# Patient Record
Sex: MALE | Race: WHITE | NOT HISPANIC OR LATINO | Employment: FULL TIME | URBAN - METROPOLITAN AREA
[De-identification: names, ages, dates, MRNs, and addresses within clinical notes are randomized per-mention and may not be internally consistent; named-entity substitution may affect disease eponyms.]

---

## 2017-07-19 ENCOUNTER — TRANSCRIBE ORDERS (OUTPATIENT)
Dept: ADMINISTRATIVE | Facility: HOSPITAL | Age: 37
End: 2017-07-19

## 2017-07-19 ENCOUNTER — APPOINTMENT (OUTPATIENT)
Dept: LAB | Facility: HOSPITAL | Age: 37
End: 2017-07-19
Payer: COMMERCIAL

## 2017-07-19 DIAGNOSIS — Z00.8 HEALTH EXAMINATION IN POPULATION SURVEY: Primary | ICD-10-CM

## 2017-07-19 DIAGNOSIS — Z00.8 HEALTH EXAMINATION IN POPULATION SURVEY: ICD-10-CM

## 2017-07-19 LAB
CHOLEST SERPL-MCNC: 212 MG/DL (ref 50–200)
EST. AVERAGE GLUCOSE BLD GHB EST-MCNC: 108 MG/DL
HBA1C MFR BLD: 5.4 % (ref 4.2–6.3)
HDLC SERPL-MCNC: 38 MG/DL (ref 40–60)
LDLC SERPL CALC-MCNC: 144 MG/DL (ref 0–100)
TRIGL SERPL-MCNC: 149 MG/DL

## 2017-07-19 PROCEDURE — 83036 HEMOGLOBIN GLYCOSYLATED A1C: CPT | Performed by: PREVENTIVE MEDICINE

## 2017-07-19 PROCEDURE — 36415 COLL VENOUS BLD VENIPUNCTURE: CPT

## 2017-07-19 PROCEDURE — 80061 LIPID PANEL: CPT

## 2018-06-29 ENCOUNTER — OFFICE VISIT (OUTPATIENT)
Dept: URGENT CARE | Facility: CLINIC | Age: 38
End: 2018-06-29
Payer: COMMERCIAL

## 2018-06-29 VITALS
HEIGHT: 73 IN | WEIGHT: 270.2 LBS | RESPIRATION RATE: 16 BRPM | SYSTOLIC BLOOD PRESSURE: 100 MMHG | OXYGEN SATURATION: 100 % | DIASTOLIC BLOOD PRESSURE: 70 MMHG | BODY MASS INDEX: 35.81 KG/M2 | TEMPERATURE: 96.5 F | HEART RATE: 65 BPM

## 2018-06-29 DIAGNOSIS — L25.5 CONTACT DERMATITIS DUE TO PLANT: Primary | ICD-10-CM

## 2018-06-29 PROCEDURE — 99203 OFFICE O/P NEW LOW 30 MIN: CPT | Performed by: FAMILY MEDICINE

## 2018-06-29 RX ORDER — PREDNISONE 10 MG/1
TABLET ORAL
Qty: 21 TABLET | Refills: 0 | Status: SHIPPED | OUTPATIENT
Start: 2018-06-29 | End: 2018-07-04

## 2018-06-29 RX ORDER — METHYLPREDNISOLONE SODIUM SUCCINATE 125 MG/2ML
125 INJECTION, POWDER, LYOPHILIZED, FOR SOLUTION INTRAMUSCULAR; INTRAVENOUS ONCE
Status: COMPLETED | OUTPATIENT
Start: 2018-06-29 | End: 2018-06-29

## 2018-06-29 RX ADMIN — METHYLPREDNISOLONE SODIUM SUCCINATE 125 MG: 125 INJECTION, POWDER, LYOPHILIZED, FOR SOLUTION INTRAMUSCULAR; INTRAVENOUS at 17:14

## 2018-06-29 NOTE — PATIENT INSTRUCTIONS
Contact Dermatitis due to plant   - Solumedrol- 125 mg IM x 1 dose administered in office today   - oral Prednisone taper prescribed, to be completed as directed  - take Benadryl-50 mg po at bedtime for itching   - apply calamine lotion to the rash  - follow up w/ pcp for re-check on Monday  - if symptoms are persisting despite treatment, worsening, or any news symptoms present, should be seen in the ER

## 2018-06-29 NOTE — PROGRESS NOTES
[email or web address] [facility]        NAME: Manoj Alston is a 44 y o  male  : 1980    MRN: [id number]  DATE: 2018  TIME: 7:38 PM    Assessment and Plan   Contact dermatitis due to plant [L25 5]  1  Contact dermatitis due to plant  methylPREDNISolone sodium succinate (Solu-MEDROL) injection 125 mg    predniSONE 10 mg tablet         Patient Instructions     Patient Instructions   Contact Dermatitis due to plant   - Solumedrol- 125 mg IM x 1 dose administered in office today   - oral Prednisone taper prescribed, to be completed as directed  - take Benadryl-50 mg po at bedtime for itching   - apply calamine lotion to the rash  - follow up w/ pcp for re-check on Monday  - if symptoms are persisting despite treatment, worsening, or any news symptoms present, should be seen in the ER     Chief Complaint     Chief Complaint   Patient presents with   [facility]         History of Present Illness       45 yo male presents for a rash which has been present for the past week  He states the day before the rash began he was outdoors pulling a tree out of the ground which had poison ivy growing on it  He was wearing a sleeveless t-shirt and states he came in direct contact w/ the poison ivy plants  He states the rash is red, bumpy, and very itchy  He has been applying rubbing alcohol to it and soaking in Epson salts but does not note any improvement  The rash is present on both arms, in the right axillary region, and the lower abdomen  Review of Systems   Review of Systems   Constitutional: Negative  HENT: Negative  Eyes: Negative  Respiratory: Negative  Cardiovascular: Negative  Gastrointestinal: Negative  Musculoskeletal: Negative  Skin:        As noted in HPI   Neurological: Negative            Current Medications       Current Outpatient Prescriptions:     predniSONE 10 mg tablet, 60 mg po today, 50 mg po tomorrow, 40 mg po the next day, 30 mg po the next day, 20 mg po the day after, and 10 mg po the last day, Disp: 21 tablet, Rfl: 0  No current facility-administered medications for this visit  Current Allergies     Allergies as of 06/29/2018    (No Known Allergies)            The following portions of the patient's history were reviewed and updated as appropriate: allergies, current medications, past family history, past medical history, past social history, past surgical history and problem list      History reviewed  No pertinent past medical history  History reviewed  No pertinent surgical history  Family History   Problem Relation Age of Onset    No Known Problems Mother     No Known Problems Father          Medications have been verified  Objective   /70   Pulse 65   Temp (!) 96 5 °F (35 8 °C)   Resp 16   Ht 6' 1" (1 854 m)   Wt 123 kg (270 lb 3 2 oz)   SpO2 100%   BMI 35 65 kg/m²        Physical Exam     Physical Exam   Constitutional: He is oriented to person, place, and time  Vital signs are normal  He appears well-developed and well-nourished  He is active and cooperative  Non-toxic appearance  He does not have a sickly appearance  He does not appear ill  No distress  Neurological: He is alert and oriented to person, place, and time  Skin: Skin is intact  Rash noted  He is not diaphoretic  Multiple linear areas and patches of erythematous vesicles noted BL upper extremities and lower abdomen  There is a large erythematous maculopapular rash in the right axillary region  No central clearing, does not appear to be a bull's eye rash  Psychiatric: He has a normal mood and affect  His behavior is normal  Judgment and thought content normal    Nursing note and vitals reviewed

## 2018-08-08 ENCOUNTER — TRANSCRIBE ORDERS (OUTPATIENT)
Dept: ADMINISTRATIVE | Facility: HOSPITAL | Age: 38
End: 2018-08-08

## 2018-08-08 ENCOUNTER — APPOINTMENT (OUTPATIENT)
Dept: LAB | Facility: HOSPITAL | Age: 38
End: 2018-08-08

## 2018-08-08 DIAGNOSIS — Z00.8 HEALTH EXAMINATION IN POPULATION SURVEY: Primary | ICD-10-CM

## 2018-08-08 DIAGNOSIS — Z00.8 HEALTH EXAMINATION IN POPULATION SURVEY: ICD-10-CM

## 2018-08-08 LAB
CHOLEST SERPL-MCNC: 228 MG/DL (ref 50–200)
EST. AVERAGE GLUCOSE BLD GHB EST-MCNC: 108 MG/DL
HBA1C MFR BLD: 5.4 % (ref 4.2–6.3)
HDLC SERPL-MCNC: 37 MG/DL (ref 40–60)
LDLC SERPL CALC-MCNC: 153 MG/DL (ref 0–100)
NONHDLC SERPL-MCNC: 191 MG/DL
TRIGL SERPL-MCNC: 189 MG/DL

## 2018-08-08 PROCEDURE — 80061 LIPID PANEL: CPT

## 2018-08-08 PROCEDURE — 83036 HEMOGLOBIN GLYCOSYLATED A1C: CPT

## 2018-08-08 PROCEDURE — 36415 COLL VENOUS BLD VENIPUNCTURE: CPT

## 2019-02-06 ENCOUNTER — OFFICE VISIT (OUTPATIENT)
Dept: BARIATRICS | Facility: CLINIC | Age: 39
End: 2019-02-06
Payer: COMMERCIAL

## 2019-02-06 VITALS
DIASTOLIC BLOOD PRESSURE: 72 MMHG | WEIGHT: 266.13 LBS | BODY MASS INDEX: 36.04 KG/M2 | HEART RATE: 63 BPM | HEIGHT: 72 IN | TEMPERATURE: 97.4 F | RESPIRATION RATE: 16 BRPM | SYSTOLIC BLOOD PRESSURE: 108 MMHG

## 2019-02-06 DIAGNOSIS — E66.01 SEVERE OBESITY (HCC): Primary | ICD-10-CM

## 2019-02-06 DIAGNOSIS — E78.2 MIXED HYPERLIPIDEMIA: ICD-10-CM

## 2019-02-06 PROBLEM — E78.5 HYPERLIPIDEMIA: Status: ACTIVE | Noted: 2019-02-06

## 2019-02-06 PROCEDURE — 99204 OFFICE O/P NEW MOD 45 MIN: CPT | Performed by: INTERNAL MEDICINE

## 2019-02-06 NOTE — PROGRESS NOTES
Assessment/Plan:    Severe obesity ([facility] )  -Discussed options of HealthyCORE-Intensive Lifestyle Intervention Program, Very Low Calorie Diet-VLCD and Conservative Program and the role of weight loss medications   -Initial weight loss goal of 5-10% weight loss for improved health  -Screening labs: reviewed HgbA1c and Lipid panel, recommend CMP, TSH  -Patient is interested in pursuing VLCD with transition to conservative program    +STOP BANG (4/8):  -reviewed risks of undiagnosed/untreated sleep apnea  -Recommended referral to sleep medicine provider for further evaluation   -Patient declined and would like to see if risks improve with weight loss  Repeat STOP BANG 3-4 months    Labs ordered: yes  HTN meds addressed: n/a  DM2 meds addressed: n/a  VLCD time restriction based on BMI: n/a    Hyperlipidemia  -Triglycerides/LDL elevated, HDL low  -recommend dietary/lifestyle changes    Goals:    Food log (ie ) www myfitnesspal com,sparkpeople  com,loseit com,calorieking  com,etc    No sugary beverages  At least 80oz of water daily  Follow up in approximately 1 week with Non-Surgical Dietician and 6 weeks w/ PA-C    Diagnoses and all orders for this visit:    Severe obesity ([facility] )  -     TSH, 3rd generation with Free T4 reflex; Future  -     Comprehensive metabolic panel; Future    Mixed hyperlipidemia          Subjective:   Chief Complaint   Patient presents with    Consult     Pt is here for MWM consult  Pt reports he is not taking medications  Patient ID: Raul George  is a 43 y o  male with excess weight/obesity here to pursue weight managment      Past Medical History:   Diagnosis Date    Hypercholesterolemia          HPI:  Obesity/Excess Weight:  Severity: Severe  Onset:  Past 8 years - yo yo with weight , usually loses weight in the summer months and then regains it in the winter  Modifiers: Diet and Exercise  Contributing factors: poor food choices, eating on the go, stays up late and eats/snacks in evening, no regular exercise  Associated symptoms: increased joint pain and clothes do not fit    Goals: 200-210 lbs    B: 1-2 bagels with butter + coffee + cream  S: mixed nuts  L: skips  S: mixed nuts  D: protein + meat + starch  S: pizza or anything In the house    Hydration: 64 oz water, 6 cups of coffee + cream/sguar, occasional juice/soda    The following portions of the patient's history were reviewed and updated as appropriate: allergies, current medications, past family history, past medical history, past social history, past surgical history and problem list     Review of Systems   Constitutional: Negative for chills and fever  HENT: Negative for sore throat  Respiratory: Negative for cough and shortness of breath  Cardiovascular: Negative for chest pain and palpitations  Gastrointestinal: Negative for abdominal pain, constipation, diarrhea, nausea and vomiting  Genitourinary: Negative for dysuria  Musculoskeletal: Positive for arthralgias  Skin: Negative for rash  Neurological: Negative for dizziness and headaches  Psychiatric/Behavioral:        Denies depression       Objective:    /72 (BP Location: Left arm, Patient Position: Sitting, Cuff Size: Large)   Pulse 63   Temp (!) 97 4 °F (36 3 °C) (Tympanic)   Resp 16   Ht 6' (1 829 m)   Wt 121 kg (266 lb 2 oz)   BMI 36 09 kg/m²     Physical Exam   Nursing note and vitals reviewed  Constitutional   General appearance: Abnormal   well developed and obese  Eyes No conjunctival pallor  Ears, Nose, Mouth, and Throat Oral mucosa moist    Pulmonary   Respiratory effort: No increased work of breathing or signs of respiratory distress  Auscultation of lungs: Clear to auscultation, equal breath sounds bilaterally, no wheezes, no rales, no rhonci  Cardiovascular   Auscultation of heart: Normal rate and rhythm, normal S1 and S2, without murmurs  Examination of extremities for edema and/or varicosities: Normal   no edema  Abdomen   Abdomen: Abnormal   The abdomen was obese  Bowel sounds were normal  The abdomen was soft and nontender     Musculoskeletal   Gait and station: Normal     Psychiatric   Orientation to person, place and time: Normal     Affect: appropriate

## 2019-02-06 NOTE — PATIENT INSTRUCTIONS
Goals: Food log (ie ) www myfitnesspal com,sparkpeople  com,loseit com,calorieking  com,etc    No sugary beverages  At least 80oz of water daily

## 2019-02-06 NOTE — ASSESSMENT & PLAN NOTE
-Discussed options of HealthyCORE-Intensive Lifestyle Intervention Program, Very Low Calorie Diet-VLCD and Conservative Program and the role of weight loss medications   -Initial weight loss goal of 5-10% weight loss for improved health  -Screening labs: reviewed HgbA1c and Lipid panel, recommend CMP, TSH  -Patient is interested in pursuing VLCD with transition to conservative program    +STOP BANG (4/8):  -reviewed risks of undiagnosed/untreated sleep apnea  -Recommended referral to sleep medicine provider for further evaluation   -Patient declined and would like to see if risks improve with weight loss   Repeat STOP BANG 3-4 months    Labs ordered: yes  HTN meds addressed: n/a  DM2 meds addressed: n/a  VLCD time restriction based on BMI: n/a

## 2019-02-07 ENCOUNTER — TELEPHONE (OUTPATIENT)
Dept: BARIATRICS | Facility: CLINIC | Age: 39
End: 2019-02-07

## 2019-02-07 ENCOUNTER — APPOINTMENT (OUTPATIENT)
Dept: LAB | Facility: HOSPITAL | Age: 39
End: 2019-02-07
Payer: COMMERCIAL

## 2019-02-07 ENCOUNTER — TRANSCRIBE ORDERS (OUTPATIENT)
Dept: ADMINISTRATIVE | Facility: HOSPITAL | Age: 39
End: 2019-02-07

## 2019-02-07 DIAGNOSIS — E66.01 SEVERE OBESITY (HCC): ICD-10-CM

## 2019-02-07 LAB
ALBUMIN SERPL BCP-MCNC: 3.9 G/DL (ref 3.5–5)
ALP SERPL-CCNC: 48 U/L (ref 46–116)
ALT SERPL W P-5'-P-CCNC: 40 U/L (ref 12–78)
ANION GAP SERPL CALCULATED.3IONS-SCNC: 8 MMOL/L (ref 4–13)
AST SERPL W P-5'-P-CCNC: 19 U/L (ref 5–45)
BILIRUB SERPL-MCNC: 0.3 MG/DL (ref 0.2–1)
BUN SERPL-MCNC: 20 MG/DL (ref 5–25)
CALCIUM SERPL-MCNC: 9 MG/DL (ref 8.3–10.1)
CHLORIDE SERPL-SCNC: 104 MMOL/L (ref 100–108)
CO2 SERPL-SCNC: 30 MMOL/L (ref 21–32)
CREAT SERPL-MCNC: 1.26 MG/DL (ref 0.6–1.3)
GFR SERPL CREATININE-BSD FRML MDRD: 71 ML/MIN/1.73SQ M
GLUCOSE P FAST SERPL-MCNC: 99 MG/DL (ref 65–99)
POTASSIUM SERPL-SCNC: 4.7 MMOL/L (ref 3.5–5.3)
PROT SERPL-MCNC: 7.3 G/DL (ref 6.4–8.2)
SODIUM SERPL-SCNC: 142 MMOL/L (ref 136–145)
TSH SERPL DL<=0.05 MIU/L-ACNC: 2.23 UIU/ML (ref 0.36–3.74)

## 2019-02-07 PROCEDURE — 80053 COMPREHEN METABOLIC PANEL: CPT

## 2019-02-07 PROCEDURE — 36415 COLL VENOUS BLD VENIPUNCTURE: CPT

## 2019-02-07 PROCEDURE — 84443 ASSAY THYROID STIM HORMONE: CPT

## 2019-02-07 NOTE — TELEPHONE ENCOUNTER
----- Message from Greta Funk PA-C sent at 2/7/2019  9:14 AM EST -----  Staff message: please call patient and let him know that thyroid function, kidney function, liver function and fasting blood sugar is normal

## 2019-02-14 ENCOUNTER — OFFICE VISIT (OUTPATIENT)
Dept: BARIATRICS | Facility: CLINIC | Age: 39
End: 2019-02-14

## 2019-02-14 VITALS — HEIGHT: 72 IN | WEIGHT: 269.8 LBS | BODY MASS INDEX: 36.54 KG/M2

## 2019-02-14 DIAGNOSIS — R63.5 ABNORMAL WEIGHT GAIN: ICD-10-CM

## 2019-02-14 PROCEDURE — VLCD

## 2019-02-14 PROCEDURE — 99999 PR OFFICE/OUTPT VISIT,PROCEDURE ONLY: CPT

## 2019-02-14 NOTE — PROGRESS NOTES
Initial RD session for VLCD completed  Components of diet discussed including ketosis, hydration, possible side effects  2 weeks of product ordered and received  Will f/u via e-mail on 2/15

## 2019-02-15 ENCOUNTER — PATIENT OUTREACH (OUTPATIENT)
Dept: BARIATRICS | Facility: CLINIC | Age: 39
End: 2019-02-15

## 2019-02-28 ENCOUNTER — OFFICE VISIT (OUTPATIENT)
Dept: BARIATRICS | Facility: CLINIC | Age: 39
End: 2019-02-28

## 2019-02-28 VITALS
WEIGHT: 253.3 LBS | SYSTOLIC BLOOD PRESSURE: 132 MMHG | DIASTOLIC BLOOD PRESSURE: 88 MMHG | HEIGHT: 72 IN | BODY MASS INDEX: 34.31 KG/M2

## 2019-02-28 DIAGNOSIS — R63.5 ABNORMAL WEIGHT GAIN: Primary | ICD-10-CM

## 2019-02-28 PROCEDURE — 99999 PR OFFICE/OUTPT VISIT,PROCEDURE ONLY: CPT

## 2019-02-28 PROCEDURE — VLCD

## 2019-03-13 ENCOUNTER — APPOINTMENT (OUTPATIENT)
Dept: LAB | Facility: HOSPITAL | Age: 39
End: 2019-03-13
Payer: COMMERCIAL

## 2019-03-13 ENCOUNTER — TRANSCRIBE ORDERS (OUTPATIENT)
Dept: ADMINISTRATIVE | Facility: HOSPITAL | Age: 39
End: 2019-03-13

## 2019-03-13 DIAGNOSIS — R63.5 ABNORMAL WEIGHT GAIN: ICD-10-CM

## 2019-03-13 LAB
ANION GAP SERPL CALCULATED.3IONS-SCNC: 9 MMOL/L (ref 4–13)
BUN SERPL-MCNC: 17 MG/DL (ref 5–25)
CALCIUM SERPL-MCNC: 9.6 MG/DL (ref 8.3–10.1)
CHLORIDE SERPL-SCNC: 102 MMOL/L (ref 100–108)
CO2 SERPL-SCNC: 28 MMOL/L (ref 21–32)
CREAT SERPL-MCNC: 1.12 MG/DL (ref 0.6–1.3)
GFR SERPL CREATININE-BSD FRML MDRD: 82 ML/MIN/1.73SQ M
GLUCOSE P FAST SERPL-MCNC: 85 MG/DL (ref 65–99)
MAGNESIUM SERPL-MCNC: 2.1 MG/DL (ref 1.6–2.6)
POTASSIUM SERPL-SCNC: 3.7 MMOL/L (ref 3.5–5.3)
SODIUM SERPL-SCNC: 139 MMOL/L (ref 136–145)

## 2019-03-13 PROCEDURE — 36415 COLL VENOUS BLD VENIPUNCTURE: CPT

## 2019-03-13 PROCEDURE — 83735 ASSAY OF MAGNESIUM: CPT

## 2019-03-13 PROCEDURE — 80048 BASIC METABOLIC PNL TOTAL CA: CPT

## 2019-03-14 ENCOUNTER — OFFICE VISIT (OUTPATIENT)
Dept: BARIATRICS | Facility: CLINIC | Age: 39
End: 2019-03-14

## 2019-03-14 VITALS
SYSTOLIC BLOOD PRESSURE: 116 MMHG | BODY MASS INDEX: 33.1 KG/M2 | WEIGHT: 244.4 LBS | DIASTOLIC BLOOD PRESSURE: 81 MMHG | HEIGHT: 72 IN

## 2019-03-14 DIAGNOSIS — R63.5 ABNORMAL WEIGHT GAIN: ICD-10-CM

## 2019-03-14 PROCEDURE — RECHECK

## 2019-03-14 PROCEDURE — VLCD

## 2019-03-14 NOTE — PROGRESS NOTES
Weight Management Medical Nutrition Assessment  Fermin Adams is here for his 4 week VLCD f/u  Current wt: 244 4 lbs  Loss of lbs 8 9 lbs in 2 weeks, and 25 4 lbs overall (average loss of 6 3 lbs/week)  No adverse effects noted  Hydration adequate  Pt is having olives on days that he is more physically active and plays basketball with his kids  Pt currently has a cold  Advised pt to drink extra water, utilize sugar-free cough drops, and to let his PCP know he is following the VLCD if he goes to his doctor  Labs were reviewed (WNL), blood pressure was taken, and directions for ordering product online were given  Will continue VLCD at this time  Anthropometric Measurements  Start Weight (lbs): 269 8 lbs  Current Weight (lbs): 244 4 lbs  TBW % Change from start weight: 8%  Ideal Body Weight (lbs): 178 lbs    Weight Loss History  Previous weight loss attempts: Self Created Diets (Portion Control, Healthy Food Choices, etc )    Food and Nutrition Related History    Meal Plan  Breakfast: meal replacement upon waking @ 7:30 am  Snack: noone   Lunch: meal replacement 11:30 am - 12 pm  Snack: protein bar @ 4 pm  Dinner: meal replacement @ 7 pm - 8 pm  Snack: none    Olives as a snack     Beverages: 80 oz water & coffee w/1 tbsp light cream    Food restrictions: <50 g CHO  Cooking: self   Food Shopping: self    Physical Activity Intake  Activity: plays basketball with his kids  Physical limitations/barriers to exercise: no intense exercise on VLCD    Estimated Needs  Energy  Bear Hungry Horse Energy Needs: BMR : 2062   1-2# loss sedentary: 5700-1403                Protein:  g (1 2-1 5g/kg IBW)  Fluid: 93 oz (35mL/kg IBW)    Nutrition Diagnosis  Yes; Overweight/obesity  related to Excess energy intake as evidenced by  BMI more than normative standard for age and sex (obesity-grade I 26-30  9)       Nutrition Intervention  Meal Plan  3 meal replacements & 1 protein bar  80+ oz water  1-2 approved snacks on more physical days  Emergency meal as needed/appropriate     Nutrition Education:   Calorie controlled menu  Lean protein food choices  Healthy snack options  Food journaling tips    Nutrition Counseling:  Strategies: meal planning, portion sizes, healthy snack choices, hydration, fiber intake, protein intake, exercise, food journal    Monitoring and Evaluation:  Evaluation criteria:  Energy Intake: ~800 kcal  Meet protein needs  Maintain adequate hydration  Monitor weekly weight  Meal planning/preparation  Food journal   Portions at mealtimes and snacks  Physical activity     Barriers to learning:none  Readiness to change: Action  Comprehension: excellent  Expected Compliance: excellent

## 2019-03-28 ENCOUNTER — OFFICE VISIT (OUTPATIENT)
Dept: BARIATRICS | Facility: CLINIC | Age: 39
End: 2019-03-28
Payer: COMMERCIAL

## 2019-03-28 VITALS
SYSTOLIC BLOOD PRESSURE: 118 MMHG | RESPIRATION RATE: 16 BRPM | HEART RATE: 61 BPM | WEIGHT: 239.38 LBS | DIASTOLIC BLOOD PRESSURE: 70 MMHG | BODY MASS INDEX: 32.42 KG/M2 | HEIGHT: 72 IN | TEMPERATURE: 97.4 F

## 2019-03-28 DIAGNOSIS — R63.5 ABNORMAL WEIGHT GAIN: Primary | ICD-10-CM

## 2019-03-28 PROBLEM — E66.811 CLASS 1 OBESITY: Status: ACTIVE | Noted: 2019-02-06

## 2019-03-28 PROBLEM — E66.9 CLASS 1 OBESITY: Status: ACTIVE | Noted: 2019-02-06

## 2019-03-28 PROCEDURE — 99214 OFFICE O/P EST MOD 30 MIN: CPT | Performed by: PHYSICIAN ASSISTANT

## 2019-03-28 NOTE — PATIENT INSTRUCTIONS
Goals: Food log (ie ) www myfitnesspal com,sparkpeople  com,loseit com,calorieking  com,etc    No sugary beverages  At least 80oz of water daily      Please get blood work 2-3 days prior to next appointment

## 2019-03-28 NOTE — PROGRESS NOTES
Assessment/Plan:    Class 1 obesity  -Patient is pursuing Very Low Calorie Diet-VLCD  -Initial weight loss goal of 5-10% weight loss for improved health  -Class 2 --> Class 1 obesity    Initial: 269 8  lbs  Current: 239 6 lbs  Change: -30 2 lbs  Goal: 200-210 lbs    Labs ordered: yes  HTN meds addressed: n/a  DM2 meds addressed: n/a  VLCD time restriction based on BMI: n/a    Goals:    Food log (ie ) www myfitnesspal com,sparkpeople  com,loseit com,calorieking  com,etc    No sugary beverages  At least 80oz of water daily  Please get blood work 2-3 days prior to next appointment    Follow up in approximately 2 weeks with Non-Surgical Physician/Advanced Practitioner  25 minute visit, >50% face-to-face time spent counseling patient on diet behavior and exercise modification for weight loss  Diagnoses and all orders for this visit:    Abnormal weight gain  -     Basic metabolic panel; Future  -     Magnesium; Future          Subjective:   Chief Complaint   Patient presents with    Follow-up     Pt is here for MWM follow up  Patient ID: Rob Galvez  is a 43 y o  male with excess weight/obesity here to pursue weight managment  Patient is pursuing Very Low Calorie Diet-VLCD  HPI Patient presents for 6 week VLCD follow up  Doing well tolerated meal replacements  Reports he is moving his bowels every 3 days  Reports he did a 10k walk this past weekend with his kids and felt horrible - reports he had an emergency meal that evening  Strongly advised against this degree of physical activity as it will slow his weight loss   Pitches for 2 hours M-F for his kids baseball practice - advised he have an approved snack such as hardboiled egg or cheese stick     The following portions of the patient's history were reviewed and updated as appropriate: allergies, current medications, past family history, past medical history, past social history, past surgical history and problem list     Review of Systems Respiratory: Negative for cough and shortness of breath  Cardiovascular: Negative for chest pain and palpitations  Gastrointestinal: Negative for abdominal pain, nausea and vomiting  Psychiatric/Behavioral:        Reports mood stable       Objective:    /70 (BP Location: Left arm, Patient Position: Sitting, Cuff Size: Standard)   Pulse 61   Temp (!) 97 4 °F (36 3 °C) (Tympanic)   Resp 16   Ht 6' (1 829 m)   Wt 109 kg (239 lb 6 oz)   BMI 32 47 kg/m²      Physical Exam   Nursing note and vitals reviewed  Constitutional   General appearance: Abnormal   well developed and obese  Eyes No conjunctival pallor  Ears, Nose, Mouth, and Throat Oral mucosa moist    Pulmonary   Respiratory effort: No increased work of breathing or signs of respiratory distress  Auscultation of lungs: Clear to auscultation, equal breath sounds bilaterally, no wheezes, no rales, no rhonci  Cardiovascular   Auscultation of heart: Normal rate and rhythm, normal S1 and S2, without murmurs  Examination of extremities for edema and/or varicosities: Normal   no edema  Abdomen   Abdomen: Abnormal   The abdomen was obese  Bowel sounds were normal  The abdomen was soft and nontender     Musculoskeletal   Gait and station: Normal     Psychiatric   Orientation to person, place and time: Normal     Affect: appropriate

## 2019-04-09 ENCOUNTER — APPOINTMENT (OUTPATIENT)
Dept: LAB | Facility: HOSPITAL | Age: 39
End: 2019-04-09
Payer: COMMERCIAL

## 2019-04-09 DIAGNOSIS — R63.5 ABNORMAL WEIGHT GAIN: ICD-10-CM

## 2019-04-09 LAB
ANION GAP SERPL CALCULATED.3IONS-SCNC: 7 MMOL/L (ref 4–13)
BUN SERPL-MCNC: 16 MG/DL (ref 5–25)
CALCIUM SERPL-MCNC: 9.4 MG/DL (ref 8.3–10.1)
CHLORIDE SERPL-SCNC: 107 MMOL/L (ref 100–108)
CO2 SERPL-SCNC: 30 MMOL/L (ref 21–32)
CREAT SERPL-MCNC: 1 MG/DL (ref 0.6–1.3)
GFR SERPL CREATININE-BSD FRML MDRD: 94 ML/MIN/1.73SQ M
GLUCOSE P FAST SERPL-MCNC: 84 MG/DL (ref 65–99)
MAGNESIUM SERPL-MCNC: 2.1 MG/DL (ref 1.6–2.6)
POTASSIUM SERPL-SCNC: 4.2 MMOL/L (ref 3.5–5.3)
SODIUM SERPL-SCNC: 144 MMOL/L (ref 136–145)

## 2019-04-09 PROCEDURE — 83735 ASSAY OF MAGNESIUM: CPT

## 2019-04-09 PROCEDURE — 80048 BASIC METABOLIC PNL TOTAL CA: CPT

## 2019-04-09 PROCEDURE — 36415 COLL VENOUS BLD VENIPUNCTURE: CPT

## 2019-04-11 ENCOUNTER — OFFICE VISIT (OUTPATIENT)
Dept: BARIATRICS | Facility: CLINIC | Age: 39
End: 2019-04-11

## 2019-04-11 VITALS
DIASTOLIC BLOOD PRESSURE: 75 MMHG | WEIGHT: 230.6 LBS | BODY MASS INDEX: 31.23 KG/M2 | HEIGHT: 72 IN | SYSTOLIC BLOOD PRESSURE: 129 MMHG

## 2019-04-11 DIAGNOSIS — R63.5 ABNORMAL WEIGHT GAIN: ICD-10-CM

## 2019-04-11 PROCEDURE — VLCD

## 2019-04-11 PROCEDURE — RECHECK

## 2019-04-25 ENCOUNTER — TELEPHONE (OUTPATIENT)
Dept: FAMILY MEDICINE CLINIC | Facility: CLINIC | Age: 39
End: 2019-04-25

## 2019-04-25 ENCOUNTER — OFFICE VISIT (OUTPATIENT)
Dept: BARIATRICS | Facility: CLINIC | Age: 39
End: 2019-04-25

## 2019-04-25 VITALS
DIASTOLIC BLOOD PRESSURE: 62 MMHG | SYSTOLIC BLOOD PRESSURE: 111 MMHG | WEIGHT: 224 LBS | HEIGHT: 72 IN | BODY MASS INDEX: 30.34 KG/M2

## 2019-04-25 DIAGNOSIS — R63.5 ABNORMAL WEIGHT GAIN: Primary | ICD-10-CM

## 2019-04-25 PROCEDURE — RECHECK

## 2019-04-25 PROCEDURE — VLCD

## 2019-05-06 ENCOUNTER — APPOINTMENT (OUTPATIENT)
Dept: LAB | Facility: HOSPITAL | Age: 39
End: 2019-05-06
Payer: COMMERCIAL

## 2019-05-06 ENCOUNTER — TRANSCRIBE ORDERS (OUTPATIENT)
Dept: ADMINISTRATIVE | Facility: HOSPITAL | Age: 39
End: 2019-05-06

## 2019-05-06 DIAGNOSIS — R63.5 ABNORMAL WEIGHT GAIN: ICD-10-CM

## 2019-05-06 LAB
ANION GAP SERPL CALCULATED.3IONS-SCNC: 9 MMOL/L (ref 4–13)
BUN SERPL-MCNC: 15 MG/DL (ref 5–25)
CALCIUM SERPL-MCNC: 9.1 MG/DL (ref 8.3–10.1)
CHLORIDE SERPL-SCNC: 106 MMOL/L (ref 100–108)
CO2 SERPL-SCNC: 28 MMOL/L (ref 21–32)
CREAT SERPL-MCNC: 1.05 MG/DL (ref 0.6–1.3)
GFR SERPL CREATININE-BSD FRML MDRD: 89 ML/MIN/1.73SQ M
GLUCOSE P FAST SERPL-MCNC: 92 MG/DL (ref 65–99)
MAGNESIUM SERPL-MCNC: 2 MG/DL (ref 1.6–2.6)
POTASSIUM SERPL-SCNC: 3.9 MMOL/L (ref 3.5–5.3)
SODIUM SERPL-SCNC: 143 MMOL/L (ref 136–145)

## 2019-05-06 PROCEDURE — 83735 ASSAY OF MAGNESIUM: CPT

## 2019-05-06 PROCEDURE — 80048 BASIC METABOLIC PNL TOTAL CA: CPT

## 2019-05-06 PROCEDURE — 36415 COLL VENOUS BLD VENIPUNCTURE: CPT

## 2019-05-09 ENCOUNTER — OFFICE VISIT (OUTPATIENT)
Dept: BARIATRICS | Facility: CLINIC | Age: 39
End: 2019-05-09

## 2019-05-09 VITALS
DIASTOLIC BLOOD PRESSURE: 86 MMHG | SYSTOLIC BLOOD PRESSURE: 122 MMHG | WEIGHT: 215.2 LBS | BODY MASS INDEX: 29.15 KG/M2 | HEIGHT: 72 IN

## 2019-05-09 DIAGNOSIS — R63.5 ABNORMAL WEIGHT GAIN: ICD-10-CM

## 2019-05-09 PROCEDURE — RECHECK

## 2019-05-09 PROCEDURE — VLCD

## 2019-05-23 ENCOUNTER — OFFICE VISIT (OUTPATIENT)
Dept: BARIATRICS | Facility: CLINIC | Age: 39
End: 2019-05-23

## 2019-05-23 VITALS — WEIGHT: 213.1 LBS | HEIGHT: 72 IN | BODY MASS INDEX: 28.86 KG/M2

## 2019-05-23 DIAGNOSIS — R63.5 ABNORMAL WEIGHT GAIN: ICD-10-CM

## 2019-05-23 PROCEDURE — DB3PK

## 2019-05-23 PROCEDURE — RECHECK

## 2019-05-31 ENCOUNTER — OFFICE VISIT (OUTPATIENT)
Dept: BARIATRICS | Facility: CLINIC | Age: 39
End: 2019-05-31
Payer: COMMERCIAL

## 2019-05-31 VITALS
HEART RATE: 53 BPM | WEIGHT: 210.6 LBS | HEIGHT: 72 IN | DIASTOLIC BLOOD PRESSURE: 64 MMHG | BODY MASS INDEX: 28.53 KG/M2 | SYSTOLIC BLOOD PRESSURE: 110 MMHG | TEMPERATURE: 96.1 F | RESPIRATION RATE: 14 BRPM

## 2019-05-31 DIAGNOSIS — R63.5 ABNORMAL WEIGHT GAIN: Primary | ICD-10-CM

## 2019-05-31 DIAGNOSIS — E66.3 OVERWEIGHT: ICD-10-CM

## 2019-05-31 DIAGNOSIS — E78.2 MIXED HYPERLIPIDEMIA: ICD-10-CM

## 2019-05-31 PROCEDURE — 99214 OFFICE O/P EST MOD 30 MIN: CPT | Performed by: PHYSICIAN ASSISTANT

## 2019-06-13 ENCOUNTER — OFFICE VISIT (OUTPATIENT)
Dept: BARIATRICS | Facility: CLINIC | Age: 39
End: 2019-06-13

## 2019-06-13 VITALS — WEIGHT: 211.4 LBS | BODY MASS INDEX: 28.67 KG/M2

## 2019-06-13 DIAGNOSIS — R63.5 ABNORMAL WEIGHT GAIN: Primary | ICD-10-CM

## 2019-06-13 PROCEDURE — RECHECK

## 2019-08-16 ENCOUNTER — OFFICE VISIT (OUTPATIENT)
Dept: BARIATRICS | Facility: CLINIC | Age: 39
End: 2019-08-16
Payer: COMMERCIAL

## 2019-08-16 VITALS
TEMPERATURE: 97 F | HEIGHT: 72 IN | SYSTOLIC BLOOD PRESSURE: 102 MMHG | WEIGHT: 214.2 LBS | DIASTOLIC BLOOD PRESSURE: 70 MMHG | BODY MASS INDEX: 29.01 KG/M2 | RESPIRATION RATE: 14 BRPM | HEART RATE: 59 BPM

## 2019-08-16 DIAGNOSIS — R63.5 ABNORMAL WEIGHT GAIN: Primary | ICD-10-CM

## 2019-08-16 DIAGNOSIS — E78.2 MIXED HYPERLIPIDEMIA: ICD-10-CM

## 2019-08-16 DIAGNOSIS — E66.3 OVERWEIGHT: ICD-10-CM

## 2019-08-16 PROCEDURE — 99214 OFFICE O/P EST MOD 30 MIN: CPT | Performed by: PHYSICIAN ASSISTANT

## 2019-08-16 NOTE — PATIENT INSTRUCTIONS
Goals: Food log (ie ) www myfitnesspal com,sparkpeople  com,loseit com,calorieking  com,etc    No sugary beverages  At least 64oz of water daily  Increase physical activity by 10 minutes daily   Gradually increase physical activity to a goal of 5 days per week for 30 minutes of MODERATE intensity PLUS 2 days per week of FULL BODY resistance training  1500 calories per day 9227-9452 calories on exercise days  5-10 servings of fruits and vegetables per day  Incorporate protein shake for breakfast   Try logging 1 day on the weekend

## 2019-08-16 NOTE — PROGRESS NOTES
Assessment/Plan:    Overweight  -Patient is pursuing Conservative Program following 12 weeks of VLCD  -Initial weight loss goal of 5-10% weight loss for improved health - met  -Food logging has been more sporadic  Would like to continue to lose at this time  -Ran out of meal replacement products and reports breakfast meal has not been structured - often chooses convenient food options at work  Would like to start using meal replacement products again for breakfast meal  -has incorporated a running routine  -reviewed appropriate calorie ranges for continued weight loss    Initial: 269 8 lbs  Current: 214 2 lbs  Change: -55 6 lbs  (21% TBW)  Goal: 200 lbs or slightly under      Hyperlipidemia  -dietary/lifestyle changes  -will repeat Lipid panel at this time, has lab slip    Goals:    Food log (ie ) www myfitnesspal com,sparkpeople  com,loseit com,calorieking  com,etc    No sugary beverages  At least 64oz of water daily  Increase physical activity by 10 minutes daily  Gradually increase physical activity to a goal of 5 days per week for 30 minutes of MODERATE intensity PLUS 2 days per week of FULL BODY resistance training  6330-4281 calories per day 8467-2045 calories on exercise days  5-10 servings of fruits and vegetables per day  Incorporate protein shake for breakfast   Try logging 1 day on the weekend    Follow up in approximately 2 months with Non-Surgical Dietician and 4 months w/ PA-C  25 minute visit, >50% face-to-face time spent counseling patient on diet behavior and exercise modification for weight loss  Diagnoses and all orders for this visit:    Abnormal weight gain  Comments:  see plan under overweight    Overweight    Mixed hyperlipidemia          Subjective:   Chief Complaint   Patient presents with    Follow-up     pt is here for mwm follow up  Patient ID: Candido James  is a 43 y o  male with excess weight/obesity here to pursue weight managment    Patient is pursuing Conservative Program  HPI Presents for [facility] follow up  States he has not been making much effort but still able to maintain his weight over past  3 months  Ideally would like to keep losing  He ran out of meal replacements and has been eating refined bagels at work for breakfast  He reports his throws off his whole day and tends to consume a lot more calories on these days due to hunger  Started a regular exercise running routine which he is happy about    Food loggin5370-3251 calories per day - does not log on weekends  Increased appetite/cravings: + increased appetite when consumes refined carbs  Fruit/Vegetable servings: 3-4 veggies, 2 fruits   Exercise: running 3x per week for 1 hour  Hydration: doing well with this    The following portions of the patient's history were reviewed and updated as appropriate: allergies, current medications, past family history, past medical history, past social history, past surgical history and problem list     Review of Systems   Respiratory: Negative for cough and shortness of breath  Cardiovascular: Negative for chest pain and palpitations  Gastrointestinal: Negative for abdominal pain, nausea and vomiting  Psychiatric/Behavioral:        Reports mood stable       Objective:    /70 (BP Location: Left arm, Patient Position: Sitting, Cuff Size: Large)   Pulse 59   Temp (!) 97 °F (36 1 °C) (Tympanic)   Resp 14   Ht 6' (1 829 m)   Wt 97 2 kg (214 lb 3 2 oz)   BMI 29 05 kg/m²      Physical Exam   Constitutional: He is oriented to person, place, and time  He appears well-developed  HENT:   Head: Normocephalic  Musculoskeletal: Normal range of motion  Neurological: He is alert and oriented to person, place, and time  Psychiatric: He has a normal mood and affect  His behavior is normal  Thought content normal    Nursing note and vitals reviewed

## 2019-08-16 NOTE — ASSESSMENT & PLAN NOTE
-Patient is pursuing Conservative Program following 12 weeks of VLCD  -Initial weight loss goal of 5-10% weight loss for improved health - met  -Food logging has been more sporadic  Would like to continue to lose at this time  -Ran out of meal replacement products and reports breakfast meal has not been structured - often chooses convenient food options at work   Would like to start using meal replacement products again for breakfast meal  -has incorporated a running routine  -reviewed appropriate calorie ranges for continued weight loss    Initial: 269 8 lbs  Current: 214 2 lbs  Change: -55 6 lbs  (21% TBW)  Goal: 200 lbs or slightly under

## 2019-08-23 ENCOUNTER — TRANSCRIBE ORDERS (OUTPATIENT)
Dept: ADMINISTRATIVE | Facility: HOSPITAL | Age: 39
End: 2019-08-23

## 2019-08-23 ENCOUNTER — APPOINTMENT (OUTPATIENT)
Dept: LAB | Facility: HOSPITAL | Age: 39
End: 2019-08-23
Payer: COMMERCIAL

## 2019-08-23 DIAGNOSIS — E78.2 MIXED HYPERLIPIDEMIA: ICD-10-CM

## 2019-08-23 LAB
CHOLEST SERPL-MCNC: 163 MG/DL (ref 50–200)
HDLC SERPL-MCNC: 49 MG/DL (ref 40–60)
LDLC SERPL CALC-MCNC: 99 MG/DL (ref 0–100)
TRIGL SERPL-MCNC: 76 MG/DL

## 2019-08-23 PROCEDURE — 80061 LIPID PANEL: CPT

## 2019-08-23 PROCEDURE — 36415 COLL VENOUS BLD VENIPUNCTURE: CPT

## 2019-08-29 ENCOUNTER — TELEPHONE (OUTPATIENT)
Dept: BARIATRICS | Facility: CLINIC | Age: 39
End: 2019-08-29

## 2019-08-29 NOTE — TELEPHONE ENCOUNTER
----- Message from Beatris Lock, [employer] sent at 8/29/2019  3:20 PM EDT -----  Called pt; lvm to return call

## 2019-08-30 NOTE — TELEPHONE ENCOUNTER
Called pt and left message to please return call  Notes recorded by Marva Alcantara PA-C on 8/29/2019 at 2:50 PM EDT  Please call patient and inform him that his lipid panel is within normal range   Improved from last year

## 2021-03-31 DIAGNOSIS — Z23 ENCOUNTER FOR IMMUNIZATION: ICD-10-CM

## 2021-04-11 ENCOUNTER — IMMUNIZATIONS (OUTPATIENT)
Dept: FAMILY MEDICINE CLINIC | Facility: HOSPITAL | Age: 41
End: 2021-04-11

## 2021-04-11 DIAGNOSIS — Z23 ENCOUNTER FOR IMMUNIZATION: Primary | ICD-10-CM

## 2021-04-11 PROCEDURE — 0001A SARS-COV-2 / COVID-19 MRNA VACCINE (PFIZER-BIONTECH) 30 MCG: CPT

## 2021-04-11 PROCEDURE — 91300 SARS-COV-2 / COVID-19 MRNA VACCINE (PFIZER-BIONTECH) 30 MCG: CPT

## 2021-05-03 ENCOUNTER — IMMUNIZATIONS (OUTPATIENT)
Dept: FAMILY MEDICINE CLINIC | Facility: HOSPITAL | Age: 41
End: 2021-05-03

## 2021-05-03 DIAGNOSIS — Z23 ENCOUNTER FOR IMMUNIZATION: Primary | ICD-10-CM

## 2021-05-03 PROCEDURE — 91300 SARS-COV-2 / COVID-19 MRNA VACCINE (PFIZER-BIONTECH) 30 MCG: CPT

## 2021-05-03 PROCEDURE — 0002A SARS-COV-2 / COVID-19 MRNA VACCINE (PFIZER-BIONTECH) 30 MCG: CPT

## 2021-08-26 ENCOUNTER — APPOINTMENT (OUTPATIENT)
Dept: LAB | Facility: HOSPITAL | Age: 41
End: 2021-08-26

## 2021-08-26 DIAGNOSIS — Z00.8 HEALTH EXAMINATION IN POPULATION SURVEY: ICD-10-CM

## 2021-08-26 LAB
CHOLEST SERPL-MCNC: 250 MG/DL (ref 50–200)
EST. AVERAGE GLUCOSE BLD GHB EST-MCNC: 105 MG/DL
HBA1C MFR BLD: 5.3 %
HDLC SERPL-MCNC: 48 MG/DL
LDLC SERPL CALC-MCNC: 175 MG/DL (ref 0–100)
NONHDLC SERPL-MCNC: 202 MG/DL
TRIGL SERPL-MCNC: 134 MG/DL

## 2021-08-26 PROCEDURE — 80061 LIPID PANEL: CPT

## 2021-08-26 PROCEDURE — 36415 COLL VENOUS BLD VENIPUNCTURE: CPT

## 2021-08-26 PROCEDURE — 83036 HEMOGLOBIN GLYCOSYLATED A1C: CPT

## 2021-08-27 ENCOUNTER — OFFICE VISIT (OUTPATIENT)
Dept: FAMILY MEDICINE CLINIC | Facility: CLINIC | Age: 41
End: 2021-08-27
Payer: COMMERCIAL

## 2021-08-27 VITALS
HEIGHT: 72 IN | SYSTOLIC BLOOD PRESSURE: 120 MMHG | OXYGEN SATURATION: 99 % | WEIGHT: 265 LBS | RESPIRATION RATE: 18 BRPM | BODY MASS INDEX: 35.89 KG/M2 | HEART RATE: 60 BPM | TEMPERATURE: 97.2 F | DIASTOLIC BLOOD PRESSURE: 72 MMHG

## 2021-08-27 DIAGNOSIS — Z13.83 SCREENING FOR CARDIOVASCULAR, RESPIRATORY, AND GENITOURINARY DISEASES: ICD-10-CM

## 2021-08-27 DIAGNOSIS — Z13.29 SCREENING FOR THYROID DISORDER: ICD-10-CM

## 2021-08-27 DIAGNOSIS — Z13.89 SCREENING FOR CARDIOVASCULAR, RESPIRATORY, AND GENITOURINARY DISEASES: ICD-10-CM

## 2021-08-27 DIAGNOSIS — Z23 NEED FOR VACCINATION: ICD-10-CM

## 2021-08-27 DIAGNOSIS — Z11.4 SCREENING FOR HIV (HUMAN IMMUNODEFICIENCY VIRUS): ICD-10-CM

## 2021-08-27 DIAGNOSIS — Z13.6 SCREENING FOR CARDIOVASCULAR, RESPIRATORY, AND GENITOURINARY DISEASES: ICD-10-CM

## 2021-08-27 DIAGNOSIS — E78.2 MIXED HYPERLIPIDEMIA: ICD-10-CM

## 2021-08-27 DIAGNOSIS — Z00.00 WELL ADULT EXAM: Primary | ICD-10-CM

## 2021-08-27 DIAGNOSIS — Z11.59 ENCOUNTER FOR HEPATITIS C SCREENING TEST FOR LOW RISK PATIENT: ICD-10-CM

## 2021-08-27 PROCEDURE — 90471 IMMUNIZATION ADMIN: CPT

## 2021-08-27 PROCEDURE — 90715 TDAP VACCINE 7 YRS/> IM: CPT

## 2021-08-27 PROCEDURE — 99386 PREV VISIT NEW AGE 40-64: CPT | Performed by: FAMILY MEDICINE

## 2021-08-27 NOTE — PROGRESS NOTES
FAMILY PRACTICE HEALTH MAINTENANCE OFFICE VISIT  [facility]    NAME: Montserrat Lake  AGE: 38 y o  SEX: male  : 1980     DATE: 2021    Assessment and Plan     1  Well adult exam    2  Screening for cardiovascular, respiratory, and genitourinary diseases  -     CBC and differential; Future    3  Screening for thyroid disorder  -     TSH, 3rd generation with Free T4 reflex; Future    4  Encounter for hepatitis C screening test for low risk patient  -     Hepatitis C antibody; Future    5  Screening for HIV (human immunodeficiency virus)  -     HIV 1/2 w/ Reflex (Multispot); Future    6  Need for vaccination  -     TDAP VACCINE GREATER THAN OR EQUAL TO 8YO IM    7  Mixed hyperlipidemia  -     Comprehensive metabolic panel; Future; Expected date: 2022  -     Lipid Panel with Direct LDL reflex; Future; Expected date: 2022        · Patient Counseling:   · Nutrition: Stressed importance of a well balanced diet, moderation of sodium/saturated fat, caloric balance and sufficient intake of fiber  · Exercise: Stressed the importance of regular exercise with a goal of 150 minutes per week  · Dental Health: Discussed daily flossing and brushing and regular dental visits   · Immunizations reviewed: See Orders  · Discussed benefits of:  Screening labs   BMI Counseling: Body mass index is 35 94 kg/m²  Discussed with patient's BMI with him  The BMI is above normal  Nutrition recommendations include reducing portion sizes, decreasing overall calorie intake, 3-5 servings of fruits/vegetables daily, reducing fast food intake, consuming healthier snacks and decreasing soda and/or juice intake  Exercise recommendations include moderate aerobic physical activity for 150 minutes/week  No follow-ups on file          Chief Complaint     Chief Complaint   Patient presents with   [address]     Needs PCP  angi    Physical Exam       History of Present Illness HPI    Well Adult Physical   Patient here for a comprehensive physical exam       Diet and Physical Activity  Diet: well balanced diet  Exercise: frequently      Depression Screen  PHQ-9 Depression Screening    PHQ-9:   Frequency of the following problems over the past two weeks:      Little interest or pleasure in doing things: 0 - not at all  Feeling down, depressed, or hopeless: 0 - not at all  PHQ-2 Score: 0          General Health  Hearing: Normal:  bilateral  Vision: no vision problems, most recent eye exam >1 year and wears glasses  Dental: regular dental visits, brushes teeth twice daily and does not floss    Reproductive Health  No issues       The following portions of the patient's history were reviewed and updated as appropriate: allergies, current medications, past family history, past medical history, past social history, past surgical history and problem list     Review of Systems     Review of Systems   Constitutional: Negative  HENT: Negative  Eyes: Negative  Respiratory: Negative  Cardiovascular: Negative  Gastrointestinal: Negative  Endocrine: Negative  Genitourinary: Negative  Musculoskeletal: Negative  Neurological: Negative  Hematological: Negative  Psychiatric/Behavioral: Negative          Past Medical History     Past Medical History:   Diagnosis Date    Hypercholesterolemia        Past Surgical History     Past Surgical History:   Procedure Laterality Date    ANTERIOR CERVICAL DISCECTOMY W/ FUSION      JOINT REPLACEMENT  2009    Spinal fusion in neck       Social History     Social History     Socioeconomic History    Marital status: /Civil Union     Spouse name: None    Number of children: None    Years of education: None    Highest education level: None   Occupational History    None   Tobacco Use    Smoking status: Never Smoker    Smokeless tobacco: Never Used   Vaping Use    Vaping Use: Never used   Substance and Sexual Activity    Alcohol use: Yes     Types: 1 Glasses of wine, 1 Cans of beer per week     Comment: Social, reports weekly (1x per week)    Drug use: No    Sexual activity: Yes     Partners: Female     Birth control/protection: Surgical   Other Topics Concern    None   Social History Narrative    None     Social Determinants of Health     Financial Resource Strain:     Difficulty of Paying Living Expenses:    Food Insecurity:     Worried About Running Out of Food in the Last Year:     Ran Out of Food in the Last Year:    Transportation Needs:     Lack of Transportation (Medical):  Lack of Transportation (Non-Medical):    Physical Activity:     Days of Exercise per Week:     Minutes of Exercise per Session:    Stress:     Feeling of Stress :    Social Connections:     Frequency of Communication with Friends and Family:     Frequency of Social Gatherings with Friends and Family:     Attends Confucianist Services:     Active Member of Clubs or Organizations:     Attends Club or Organization Meetings:     Marital Status:    Intimate Partner Violence:     Fear of Current or Ex-Partner:     Emotionally Abused:     Physically Abused:     Sexually Abused:        Family History     Family History   Problem Relation Age of Onset    No Known Problems Mother     Hypertension Father     No Known Problems Sister     Diabetes Maternal Grandmother     Cancer Maternal Grandmother         Still alive in 80's    Cancer Maternal Grandfather         Lung Cancer at age 80   Cancer Paternal Grandfather         Prostate Cancer at 80   Heart disease Neg Hx     Stroke Neg Hx     Thyroid disease Neg Hx        Current Medications     No current outpatient medications on file       Allergies     Allergies   Allergen Reactions    Pollen Extract Sneezing       Objective     /72   Pulse 60   Temp (!) 97 2 °F (36 2 °C)   Resp 18   Ht 6' (1 829 m)   Wt 120 kg (265 lb)   SpO2 99%   BMI 35 94 kg/m²      Physical Exam  Constitutional:       General: He is not in acute distress  Appearance: He is well-developed  He is not diaphoretic  HENT:      Head: Normocephalic and atraumatic  Right Ear: Hearing, tympanic membrane, ear canal and external ear normal       Left Ear: Hearing, tympanic membrane, ear canal and external ear normal       Nose: Nose normal       Mouth/Throat:      Pharynx: No oropharyngeal exudate  Eyes:      General: No scleral icterus  Right eye: No discharge  Left eye: No discharge  Conjunctiva/sclera: Conjunctivae normal       Pupils: Pupils are equal, round, and reactive to light  Neck:      Thyroid: No thyromegaly  Trachea: No tracheal deviation  Cardiovascular:      Rate and Rhythm: Normal rate and regular rhythm  Heart sounds: Normal heart sounds  No murmur heard  No friction rub  No gallop  Pulmonary:      Effort: Pulmonary effort is normal  No respiratory distress  Breath sounds: Normal breath sounds  No wheezing or rales  Chest:      Chest wall: No tenderness  Abdominal:      General: Bowel sounds are normal  There is no distension  Palpations: Abdomen is soft  There is no mass  Tenderness: There is no abdominal tenderness  There is no guarding or rebound  Musculoskeletal:         General: No tenderness or deformity  Normal range of motion  Cervical back: Normal range of motion and neck supple  Skin:     General: Skin is warm and dry  Coloration: Skin is not pale  Findings: No erythema or rash  Neurological:      Mental Status: He is alert and oriented to person, place, and time  Motor: No abnormal muscle tone  Coordination: Coordination normal       Deep Tendon Reflexes: Reflexes normal    Psychiatric:         Behavior: Behavior normal          Thought Content:  Thought content normal          Judgment: Judgment normal             Visual Acuity Screening    Right eye Left eye Both eyes   Without correction:      With correction: 20/13 20/13 20/13           MD ADIN Crowley [facility]

## 2021-12-23 NOTE — ASSESSMENT & PLAN NOTE
-Patient is pursuing Very Low Calorie Diet-VLCD  -Initial weight loss goal of 5-10% weight loss for improved health  -Class 2 --> Class 1 obesity    Initial: 269 8  lbs  Current: 239 6 lbs  Change: -30 2 lbs  Goal: 200-210 lbs    Labs ordered: yes  HTN meds addressed: n/a  DM2 meds addressed: n/a  VLCD time restriction based on BMI: n/a 135

## 2022-01-23 ENCOUNTER — IMMUNIZATIONS (OUTPATIENT)
Dept: FAMILY MEDICINE CLINIC | Facility: HOSPITAL | Age: 42
End: 2022-01-23

## 2022-01-23 DIAGNOSIS — Z23 ENCOUNTER FOR IMMUNIZATION: Primary | ICD-10-CM

## 2022-01-23 PROCEDURE — 0001A COVID-19 PFIZER VACC 0.3 ML: CPT

## 2022-01-23 PROCEDURE — 91300 COVID-19 PFIZER VACC 0.3 ML: CPT

## 2022-02-22 ENCOUNTER — OFFICE VISIT (OUTPATIENT)
Dept: DERMATOLOGY | Facility: CLINIC | Age: 42
End: 2022-02-22
Payer: COMMERCIAL

## 2022-02-22 VITALS
WEIGHT: 278 LBS | BODY MASS INDEX: 37.65 KG/M2 | OXYGEN SATURATION: 98 % | TEMPERATURE: 98 F | HEIGHT: 72 IN | HEART RATE: 73 BPM

## 2022-02-22 DIAGNOSIS — D48.5 NEOPLASM OF UNCERTAIN BEHAVIOR OF SKIN: Primary | ICD-10-CM

## 2022-02-22 DIAGNOSIS — Z12.83 SCREENING EXAM FOR SKIN CANCER: ICD-10-CM

## 2022-02-22 PROCEDURE — 88342 IMHCHEM/IMCYTCHM 1ST ANTB: CPT | Performed by: STUDENT IN AN ORGANIZED HEALTH CARE EDUCATION/TRAINING PROGRAM

## 2022-02-22 PROCEDURE — 3008F BODY MASS INDEX DOCD: CPT | Performed by: DERMATOLOGY

## 2022-02-22 PROCEDURE — 99204 OFFICE O/P NEW MOD 45 MIN: CPT | Performed by: DERMATOLOGY

## 2022-02-22 PROCEDURE — 1036F TOBACCO NON-USER: CPT | Performed by: DERMATOLOGY

## 2022-02-22 PROCEDURE — 88341 IMHCHEM/IMCYTCHM EA ADD ANTB: CPT | Performed by: STUDENT IN AN ORGANIZED HEALTH CARE EDUCATION/TRAINING PROGRAM

## 2022-02-22 PROCEDURE — 11102 TANGNTL BX SKIN SINGLE LES: CPT | Performed by: DERMATOLOGY

## 2022-02-22 PROCEDURE — 88305 TISSUE EXAM BY PATHOLOGIST: CPT | Performed by: STUDENT IN AN ORGANIZED HEALTH CARE EDUCATION/TRAINING PROGRAM

## 2022-02-22 NOTE — PROGRESS NOTES
[facility] Note     Patient Name: Constantine Hargrove  Encounter Date: 2/22/2022     Have you been cared for by a [facility] Dermatologist in the last 3 years and, if so, which one? No    · Have you traveled outside of the [address] [place] [address] in the past 3 months or outside of the [facility] area in the last 2 weeks? No     May we call your Preferred Phone number to discuss your specific medical information? Yes     May we leave a detailed message that includes your specific medical information? Yes      Today's Chief Concerns:   Concern #1:  Skin lesions on shoulder/chest area and back    Concern #2:  Lump on lower back  Had it for 20 years  Past Medical History:  Have you personally ever had or currently have any of the following? · Skin cancer (such as Melanoma, Basal Cell Carcinoma, Squamous Cell Carcinoma? (If Yes, please provide more detail)- No  · Eczema: No  · Psoriasis: No  · HIV/AIDS: No  · Hepatitis B or C: No  · Tuberculosis: No  · Systemic Immunosuppression such as Diabetes, Biologic or Immunotherapy, Chemotherapy, Organ Transplantation, Bone Marrow Transplantation (If YES, please provide more detail): No  · Radiation Treatment (If YES, please provide more detail): No  · Any other major medical conditions/concerns? 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 Playing sport with the kids     Family History:  Have any of your "first degree relatives" (parent, brother, sister, or child) had any of the following       · Skin cancer such as Melanoma or Merkel Cell Carcinoma or Pancreatic Cancer? No  · Eczema, Asthma, Hay Fever or Seasonal Allergies: YES, Son has hx of asthma   · Psoriasis or Psoriatic Arthritis: No  · Do any other medical conditions seem to run in your family? If Yes, what condition and which relatives?   No    Current Medications:       No current outpatient medications on file  Review of Systems:  Have you recently had or currently have any of the following? If YES, what are you doing for the problem? · Fever, chills or unintended weight loss: No  · Sudden loss or change in your vision: No  · Nausea, vomiting or blood in your stool: No  · Painful or swollen joints: No  · Wheezing or cough: No  · Changing mole or non-healing wound: No  · Nosebleeds: No  · Excessive sweating: No  · Easy or prolonged bleeding? No  · Over the last 2 weeks, how often have you been bothered by the following problems? · Taking little interest or pleasure in doing things: 1 - Not at All  · Feeling down, depressed, or hopeless: 1 - Not at All  · Rapid heartbeat with epinephrine:  No    · FEMALES ONLY:    · Are you pregnant or planning to become pregnant? N/A  · Are you currently or planning to be nursing or breast feeding? N/A    · Any known allergies? Allergies   Allergen Reactions    Pollen Extract Sneezing         Physical Exam:     Was a chaperone (Derm Clinical Assistant) present throughout the entire Physical Exam? Yes     Did the Dermatology Team specifically  the patient on the importance of a Full Skin Exam to be sure that nothing is missed clinically?  Yes}  o Did the patient ultimately request or accept a Full Skin Exam?  Yes  o Did the patient specifically refuse to have the areas "under-the-bra" examined by the Dermatologist? No  o Did the patient specifically refuse to have the areas "under-the-underwear" examined by the Dermatologist? No    CONSTITUTIONAL:   Vitals:    02/22/22 1443   Pulse: 73   Temp: 98 °F (36 7 °C)   TempSrc: Temporal   SpO2: 98%   Weight: 126 kg (278 lb)   Height: 6' (1 829 m)       PSYCH: Normal mood and affect  EYES: Normal conjunctiva  ENT: Normal lips and oral mucosa  CARDIOVASCULAR: No edema  RESPIRATORY: Normal respirations    SKIN:  FULL ORGAN SYSTEM EXAM   Hair, Scalp, Ears, Face Normal except as noted below in Assessment Neck, Cervical Chain Nodes Normal except as noted below in Assessment   Right Arm/Hand/Fingers Normal except as noted below in Assessment   Left Arm/Hand/Fingers Normal except as noted below in Assessment   Chest/Breasts/Axillae Viewed areas Normal except as noted below in Assessment   Abdomen, Umbilicus Normal except as noted below in Assessment   Back/Spine Normal except as noted below in Assessment   Groin/Genitalia/Buttocks NOT EXAMINED   Right Leg, Foot, Toes Normal except as noted below in Assessment   Left Leg, Foot, Toes Normal except as noted below in Assessment        Assessment and Plan by Diagnosis:    History of Present Condition:     Duration:  How long has this been an issue for you?    o  2 years- lesions on chest/shoulders  o 2 year- Mole on upper back - changing in color  o 20 years - lump on lower back   Location Affected:  Where on the body is this affecting you?    o  See above    Quality:  Is there any bleeding, pain, itch, burning/irritation, or redness associated with the skin lesion?    o  N/A   Severity:  Describe any bleeding, pain, itch, burning/irritation, or redness on a scale of 1 to 10 (with 10 being the worst)  o  N/A   Timing:  Does this condition seem to be there pretty constantly or do you notice it more at specific times throughout the day?    o  N/A   Context:  Have you ever noticed that this condition seems to be associated with specific activities you do?    o  N/A   Modifying Factors:    o Anything that seems to make the condition worse? -  N/A  o What have you tried to do to make the condition better? -  N/A   Associated Signs and Symptoms:  Does this skin lesion seem to be associated with any of the following:  o  N/A     1  NEOPLASM OF UNCERTAIN BEHAVIOR OF SKIN    Physical Exam:   (Anatomic Location); (Size and Morphological Description); (Differential Diagnosis):  o Specimen A: Posterior neck; Skin;  Shave Biopsy; 42year old male with 0 6 cm x dark brown macule with central hyperpigmentation; Differential Diagnosis: Dysplastic nevus rule out Atypia   Pertinent Positives:   Pertinent Negatives: Additional History of Present Condition:  Mole present for 2 years  Wife noticed the mole changing in color  No history of skin cancer  Assessment and Plan:   I have discussed with the patient that a sample of skin via a "skin biopsy would be potentially helpful to further make a specific diagnosis under the microscope   Based on a thorough discussion of this condition and the management approach to it (including a comprehensive discussion of the known risks, side effects and potential benefits of treatment), the patient (family) agrees to implement the following specific plan:    o Procedure:  Skin Biopsy  After a thorough discussion of treatment options and risk/benefits/alternatives (including but not limited to local pain, scarring, dyspigmentation, blistering, possible superinfection, and inability to confirm a diagnosis via histopathology), verbal and written consent were obtained and portion of the rash was biopsied for tissue sample  See below for consent that was obtained from patient and subsequent Procedure Note  PROCEDURE SHAVE BIOPSY NOTE:     Performing Physician: Dr Katina Butt supervised by Dr Dorado   Anatomic Location; Clinical Description with size (cm); Pre-Op Diagnosis:   Specimen A: Posterior neck; Skin; Shave Biopsy; 42year old male with 0 6 cm x dark brown macule with central hyperpigmentation; Differential Diagnosis: Atypical pigmented lesion, atypical nevus   Local anesthesia: 1% xylocaine with epi       Topical anesthesia: None     Hemostasis: Aluminum chloride       After obtaining informed consent  at which time there was a discussion about the purpose of biopsy  and low risks of infection and bleeding  The area was prepped and draped in the usual fashion  Anesthesia was obtained with 1% lidocaine with epinephrine  A shave biopsy to an appropriate sampling depth was obtained with a sterile blade (such as a 15-blade or DermaBlade)  The resulting wound was covered with surgical ointment and bandaged appropriately  The patient tolerated the procedure well without complications and was without signs of functional compromise  Specimen has been sent for review by Dermatopathology  Standard post-procedure care has been explained and has been included in written form within the patient's copy of Informed Consent  INFORMED CONSENT DISCUSSION AND POST-OPERATIVE INSTRUCTIONS FOR PATIENT    I   RATIONALE FOR PROCEDURE  I understand that a skin biopsy allows the Dermatologist to test a lesion or rash under the microscope to obtain a diagnosis  It usually involves numbing the area with numbing medication and removing a small piece of skin; sometimes the area will be closed with sutures  In this specific procedure, sutures are not usually needed  If any sutures are placed, then they are usually need to be removed in 2 weeks or less  I understand that my Dermatologist recommends that a skin "shave" biopsy be performed today  A local anesthetic, similar to the kind that a dentist uses when filling a cavity, will be injected with a very small needle into the skin area to be sampled  The injected skin and tissue underneath "will go to sleep and become numb so no pain should be felt afterwards  An instrument shaped like a tiny "razor blade" (shave biopsy instrument) will be used to cut a small piece of tissue and skin from the area so that a sample of tissue can be taken and examined more closely under the microscope  A slight amount of bleeding will occur, but it will be stopped with direct pressure and a pressure bandage and any other appropriate methods  I understands that a scar will form where the wound was created  Surgical ointment will be applied to help protect the wound  Sutures are not usually needed      II  RISKS AND POTENTIAL COMPLICATIONS   I understand the risks and potential complications of a skin biopsy include but are not limited to the following:   Bleeding   Infection   Pain   Scar/keloid   Skin discoloration   Incomplete Removal   Recurrence   Nerve Damage/Numbness/Loss of Function   Allergic Reaction to Anesthesia   Biopsies are diagnostic procedures and based on findings additional treatment or evaluation may be required   Loss or destruction of specimen resulting in no additional findings    My Dermatologist has explained to me the nature of the condition, the nature of the procedure, and the benefits to be reasonably expected compared with alternative approaches  My Dermatologist has discussed the likelihood of major risks or complications of this procedure including the specific risks listed above, such as bleeding, infection, and scarring/keloid  I understand that a scar is expected after this procedure  I understand that my physician cannot predict if the scar will form a "keloid," which extends beyond the borders of the wound that is created  A keloid is a thick, painful, and bumpy scar  A keloid can be difficult to treat, as it does not always respond well to therapy, which includes injecting cortisone directly into the keloid every few weeks  While this usually reduces the pain and size of the scar, it does not eliminate it  I understand that photographs may be taken before and after the procedure  These will be maintained as part of the medical providers confidential records and may not be made available to me  I further authorize the medical provider to use the photographs for teaching purposes or to illustrate scientific papers, books, or lectures if in his/her judgment, medical research, education, or science may benefit from its use  I have had an opportunity to fully inquire about the risks and benefits of this procedure and its alternatives     I have been given ample time and opportunity to ask questions and to seek a second opinion if I wished to do so  I acknowledge that there have specifically been no guarantees as to the cosmetic results from the procedure  I am aware that with any procedure there is always the possibility of an unexpected complication  III  POST-PROCEDURAL CARE (WHAT YOU WILL NEED TO DO "AFTER THE BIOPSY" TO OPTIMIZE HEALING)     Keep the area clean and dry  Try NOT to remove the bandage or get it wet for the first 24 hours   Gently clean the area and apply surgical ointment (such as Vaseline petrolatum ointment, which is available "over the counter" and not a prescription) to the biopsy site for up to 2 weeks straight  This acts to protect the wound from the outside world   Sutures are not usually placed in this procedure  If any sutures were placed, return for suture removal as instructed (generally 1 week for the face, 2 weeks for the body)   Take Acetaminophen (Tylenol) for discomfort, if no contraindications  Ibuprofen or aspirin could make bleeding worse   Call our office immediately for signs of infection: fever, chills, increased redness, warmth, tenderness, discomfort/pain, or pus or foul smell coming from the wound  WHAT TO DO IF THERE IS ANY BLEEDING? If a small amount of bleeding is noticed, place a clean cloth over the area and apply firm pressure for ten minutes  Check the wound after 10 minutes of direct pressure  If bleeding persists, try one more time for an additional 10 minutes of direct pressure on the area  If the bleeding becomes heavier or does not stop after the second attempt, or if you have any other questions about this procedure, then please call your [facility] by calling [phone] (SKIN)       I hereby acknowledge that I have reviewed and verified the site with my Dermatologist and have requested and authorized my Dermatologist to proceed with the procedure  SCREENING SKIN EXAM    Physical Exam:   Anatomic Location Affected:   Lower back      Additional History of Present Condition:  Patient states he noticed the lesion about two years ago, it can get irritated at times  Assessment and Plan:  Based on a thorough discussion of this condition and the management approach to it (including a comprehensive discussion of the known risks, side effects and potential benefits of treatment), the patient (family) agrees to implement the following specific plan:   Reassured benign   Monitor for changes   When outside we recommend using a wide brim hat, sunglasses, long sleeve and pants, sunscreen with SPF 14+ with reapplication every 2 hours, or SPF specific clothing    Recommended annual skin checks      What is a congenital melanocytic nevus? A congenital melanocytic nevus is a proliferation of benign melanocytes that are present at birth or develop shortly after birth  This form of a congenital nevus is also known as a brown birthmark  Similar melanocytic nevi, or moles that were not present at birth, are often called 'congenital melanocytic nevus-like' nevi, 'congenital type' nevi or 'tardive' nevi  2013 Classification of congenital melanocytic nevi  In 2013, a new categorisation of congenital melanocytic nevi using predicted adult size was proposed:   Small (< 1 5 cm)   Medium (M1: 1 5-10 cm, M2: > 10-20 cm)   Large (L1: > 20-30 cm, L2: > 30-40 cm)   Giant (G1: > 40-60 cm, G2: > 60 cm)   Satellite nevi: none, 1-20, > 20-50, and > 50 satellites    Congenital melanocytic nevi should be described according to their body site, colors, surface features and whether or not there is hypertrichosis (hairs)  Progression over time  Congenital melanocytic nevi usually grow proportionally with the child   As a rough guide, the likely adult size of a congenital nevus can be calculated as follows:   Lower limbs: adult size is x 3 3 size at birth   Upper limbs/torso: adult size is x 2 8 size at birth  Ardeth Needs Head: adult size is x 1 7 size at birth  Descriptive names of some congenital nevi  Some congenital nevi are given specific descriptive names  Some of these are listed here  Speckled lentiginous nevus   Also called nevus spilus   Dark spots on a flat tan background   The number of spots may increase or decrease over time  Satellite lesions   Found on the periphery of central congenital melanocytic nevus or elsewhere on the body   Smaller melanocytic nevi similar in appearance to    Present in > 70% of patients with a large congenital melanocytic nevus  Tardive nevus   Melanocytic nevus that appears after birth   Slower growth and less synthesis of melanin than congenital nevus   Histopathology is similar to true congenital melanocytic nevi  Garment nevus   The name relates to the anatomical location of nevus   Bathing trunk nevus involves central areas usually covered by a bathing costume, for example, buttocks   Coat sleeve nevus involves an entire arm and proximal shoulder  Halo nevus   Affects some congenital and tardive melanocytic nevi   Surrounding skin becomes lighter or white   The central lesion may also become lighter and smaller and may disappear   Due to immune destruction of melanocytes    How common are congenital melanocytic nevi?  Small congenital nevi occur in 1 in 100 births   Medium congenital nevi occur in 1 in 1000 births    Giant congenital melanocytic nevi are much rarer (1 in 20,000 live births)  They occur in all races and ethnic groups, and males and females are at equal risk  What do congenital melanocytic nevi look like? Congenital melanocytic nevi present as single or multi-shaded, round or oval-shaped pigmented patches  They may have increased hair growth (hypertrichosis)  The surface may be slightly rough or bumpy    Congenital nevi usually enlarge as the child grows but they may sometimes become smaller and less obvious with time  Rarely some may even disappear  However, they may also become darker, raised, more bumpy and hairy, particularly around the time of puberty  Do congenital melanocytic nevi cause any symptoms? Congenital melanocytic nevi are usually asymptomatic, however, some may be itchy, particularly larger lesions  It is thought there may be a reduced function of sebaceous (oil) and eccrine (sweat) glands, which may result in skin dryness and a heightened sensation of itch  The overlying skin may become fragile and erode or ulcerate  Deep nests of melanocytes in the dermis may weaken the bonds between the epidermis and the dermis and account for skin fragility  Congenital melanocytic nevi are often unsightly, especially when extensive, ie large or giant congenital melanocytic nevi  They may, therefore, result in anxiety and impaired self-image, especially when the lesions are in visible areas  Giant melanocytic nevi, and to a lesser degree small lesions, are associated with increased risk of developing cutaneous melanoma, neurocutaneous melanoma and rarely other tumours (see below)  What causes a congenital melanocytic nevus? Congenital melanocytic nevi are caused by localised genetic abnormalities resulting in the proliferation of melanocytes; these are cells in the skin responsible for normal skin color  This abnormal proliferation is thought to occur between the 5th and 24th weeks of gestation  If proliferation starts early in development, giant and medium-sized congenital melanocytic nevi are formed  Smaller congenital melanocytic nevi are formed later in development after the melanoblasts (immature melanocytes) have migrated from the neural crest to the skin  In some cases, there is also overgrowth of hair-forming cells and epidermis, forming an organoid nevus    Very early onset of congenital nevus before the separation of the upper and lower eyelids results in kissing nevi, ie one part of the nevus is on the upper lid and the other part is on the lower eyelid  How is the diagnosis of congenital melanocytic nevus made? The diagnosis of a congenital melanocytic nevus is usually based on the clinical appearance  If there is any doubt, examining the lesion with dermoscopy or taking a sample of the lesion for histology (biopsy) may show characteristic microscopic features  Dermoscopy  Evaluation of the congenital melanocytic nevus by dermoscopy will reveal the pattern of pigmentation and its symmetry or lack of symmetry  The most common global pattern of congenital or tardive melanocytic nevus is globular, but reticular, structureless and mixed patterns may occur  The nevus may have differing structures across the lesion, sometimes leading to overall asymmetry of the structure  Pathology  Congenital melanocytic nevi are usually larger than acquired nevi (which are melanocytic nevi that appear after 3years of age), and the nevus cells often extend deeper into the dermis, fat layer, and deeper structures  The nevus cells characteristically cluster around blood vessels, hair follicles, sebaceous and eccrine glands, and other skin structures  Congenital nevus cells tend to involve collagen bundles in the deeper layers of the skin more than is the case in an acquired nevus  Risk of developing melanoma within a congenital melanocytic nevus  The following characteristics of congenital melanocytic nevus are associated with the increased risk of development of melanoma (a skin cancer)   Large or giant size   Axial or paravertebral location (crossing the spine)   Multiple congenital satellite nevi   Neurocutaneous melanosis  The risk of melanoma is mainly related to the size of the congenital melanocytic nevus  Small and medium-sized congenital melanocytic nevi have a very small risk, well under 1%   Melanoma is more likely to develop in giant congenital nevi (lifetime estimates are 5-10%), particularly in lesions that lie across the spine or where there are multiple satellite lesions  Melanoma can start deep inside the nevus or within any neuromelanosis found in the brain and spinal cord  Very rarely, other tissues that contain melanocytes may also be a source of melanoma such as the gastrointestinal tract mucosa  In 24% of cases, the origin of the melanoma cannot be identified  Melanoma associated with a giant congenital melanocytic nevus or neuromelanosis can be very difficult to detect and treat  The risk of development of melanoma is greater in early childhood; 70% of melanomas associated with giant congenital melanocytic nevi are diagnosed by the age of [de-identified] years  Rarely, other types of tumour may develop within giant congenital melanocytic nevi including benign tumours (lipomas, schwannomas) and other malignant tumours (including sarcomas)  Melanoma can also develop within a small congenital melanocytic nevus  This is rare and likely to occur on the periphery of the nevus during adult life  Is regular follow-up recommended?  It can be useful to have a close-up photograph of the congenital nevus with a ruler beside it to assess for changes in size   Digital surveillance using dermoscopic images (mole mapping) may also be helpful to detect changes in structure  However, such changes are normal in childhood and should not usually give rise to concern   It is advisable to continue neurodevelopmental observation in those at risk of neurocutaneous melanosis  Prognosis of melanoma associated with congenital melanocytic nevus  Unfortunately, when a rare melanoma arises within a giant congenital melanocytic nevus, the prognosis is unfavourable  This is due to the deeper origin of the tumour rendering it more difficult to detect on clinical examination, resulting in a later stage at presentation   The deeper location also facilitates earlier spread through blood and lymph vessels  In 24% of cases, the melanoma has already spread to other sites (metastases) at the time of the first diagnosis  Treatment of a congenital melanocytic nevus  Management of a congenital melanocytic nevus must take into account the age of the subject, the lesion size, the location and depth, and the risk of developing malignant change within the lesion  Giant congenital melanocytic nevus  The only definite indication for surgery in a giant congenital melanocytic nevus is when melanoma develops within it  Small congenital nevus  If a small congenital nevus is growing at the same rate as the child and is not changing in any other way, the usual practice is not to remove it until the child is old enough to co-operate with a local anaesthetic injection, usually around the age of 8 to 15 years  Even then, removal is not essential   Reasons to consider surgical removal may include:   Unsightly appearance   Difficulty in observing the mole (eg, scalp, back)   A recent change in the lesion (darkening, lumpiness, increasing size)   Melanoma-like appearance (irregular shape, variegated color)  Prophylactic surgical removal of a nevus  The following factors should be considered prior to prophylactic surgical removal of a nevus   Prophylactic excision of a small lesion may be delayed until an age when the patient is old enough to make an informed choice   Small or medium-sized congenital melanocytic nevi are at low risk for developing malignant change   Irregular, lumpy or thick lesions or lesions that are difficult to clinically assess may have a lower threshold for consideration of surgical excision, so as not to miss a melanoma   50% of melanomas diagnosed in those with giant congenital melanocytic nevi occur at another body site such as within the central nervous system  Therefore surgical excision of the lesion may not eliminate the risk of melanoma     Large or giant melanocytic lesions may be too large to excise completely   Large lesions may require a skin flap or graft to close the surgical defect  Complications of surgery  Complications that may occur after surgery include:   Graft or flap failure   Infection   Wound breakdown   Bleeding or haematoma   Hypertrophic or keloid scar   Irritable or itchy scar  Other treatment options for a congenital melanocytic nevus    Dermabrasion  Dermabrasion can allow partial removal of a large congenital nevus; deeper nevus cells may persist  Dermabrasion may lighten the color of the nevus but may not reduce hair growth within it  It can cause scarring  Tangential (shave) excision  Tangential or shave excision uses a blade to remove the top layers of the skin (epidermis and upper dermis)  This may reduce the pigmentation but the lesion may not be completely removed  Shave excision may result in significant scarring  Chemical peels  Chemical peels using trichloroacetic acid or phenol may lighten the pigmentation of a superficial (surface) congenital nevus that is located in the upper layers of the skin  Laser ablation  Laser treatment is considered if surgical intervention is not possible  They may result in lightening of the lesion  Suitable devices include:   Key Q-switched laser   Carbon dioxide resurfacing laser       Monitor for changes   When outside we recommend using a wide brim hat, sunglasses, long sleeve and pants, sunscreen with SPF 20+ with reapplication every 2 hours, or SPF specific clothing    Annual skin checks     Asymmetry: Healthy moles tend to be symmetric, while melanomas are often asymmetric  Asymmetry means if you draw a line through the mole, the two halves do not match in color, size, shape, or surface texture  Asymmetry can be a result of rapid enlargement of a mole, the development of a raised area on a previously flat lesion, scaling, ulceration, bleeding or scabbing within the mole  Any mole that starts to demonstrate "asymmetry" should be examined promptly by a board certified dermatologist      Border: Healthy moles tend to have discrete, even borders  The border of a melanoma often blends into the normal skin and does not sharply delineate the mole from normal skin  Any mole that starts to demonstrate "uneven borders" should be examined promptly by a board certified dermatologist      Color: Healthy moles tend to be one color throughout  Melanomas tend to be made up of different colors ranging from dark black, blue, white, or red  Any mole that demonstrates a color change should be examined promptly by a board certified dermatologist      Diameter: Healthy moles tend to be smaller than 0 6 cm in size; an exception are "congenital nevi" that can be larger  Melanomas tend to grow and can often be greater than 0 6 cm (1/4 of an inch, or the size of a pencil eraser)  This is only a guideline, and many normal moles may be larger than 0 6 cm without being unhealthy  Any mole that starts to change in size (small to bigger or bigger to smaller) should be examined promptly by a board certified dermatologist      Evolving: Healthy moles tend to "stay the same "  Melanomas may often show signs of change or evolution such as a change in size, shape, color, or elevation  Any mole that starts to itch, bleed, crust, burn, hurt, or ulcerate or demonstrate a change or evolution should be examined promptly by a board certified dermatologist       Dysplastic Nevi  Dysplastic moles are moles that fit the ABCDE rules of melanoma but are not identified as melanomas when examined under the microscope  They may indicate an increased risk of melanoma in that person  If there is a family history of melanoma, most experts agree that the person may be at an increased risk for developing a melanoma    Experts still do not agree on what dysplastic moles mean in patients without a personal or family history of melanoma  Dysplastic moles are usually larger than common moles and have different colors within it with irregular borders  The appearance can be very similar to a melanoma  Biopsies of dysplastic moles may show abnormalities which are different from a regular mole  Melanoma  Malignant melanoma is a type of skin cancer that can be deadly if it spreads throughout the body  The incidence of melanoma in the [place] is growing faster than any other cancer  Melanoma usually grows near the surface of the skin for a period of time, and then begins to grow deeper into the skin  Once it grows deeper into the skin, the risk of spread to other organs greatly increases  Therefore, early detection and removal of a malignant melanoma may result in a better chance at a complete cure; removal after the tumor has spread may not be as effective, leading to worse clinical outcomes such as death  The true rate of nevus transformation into a melanoma is unknown  It has been estimated that the lifetime risk for any acquired melanocytic nevus on any 21year-old individual transforming into melanoma by age [de-identified] is 0 03% (1 in 3,164) for men and 0 009% (1 in 10,800) for women  The appearance of a "new mole" remains one of the most reliable methods for identifying a malignant melanoma  Occasionally, melanomas appear as rapidly growing, blue-black, dome-shaped bumps within a previous mole or previous area of normal skin  Other times, melanomas are suspected when a mole suddenly appears or changes  Itching, burning, or pain in a pigmented lesion should increase suspicion, but most patients with early melanoma have no skin discomfort whatsoever  Melanoma can occur anywhere on the skin, including areas that are difficult for self-examination  Many melanomas are first noticed by other family members  Suspicious-looking moles may be removed for microscopic examination         You may be able to prevent death from melanoma by doing two simple things:    1  Try to avoid unnecessary sun exposure and protect your skin when it is exposed to the sun  People who live near the equator, people who have intermittent exposures to large amounts of sun, and people who have had sunburns in childhood or adolescence have an increased risk for melanoma  Sun sense and vigilant sun protection may be keys to helping to prevent melanoma  We recommend wearing UPF-rated sun protective clothing and sunglasses whenever possible and applying a moisturizer-sunscreen combination product (SPF 50+) such as Neutrogena Daily Defense to sun exposed areas of skin at least three times a day  2  Have your moles regularly examined by a board certified dermatologist AND by yourself or a family member/friend at home  We recommend that you have your moles examined at least once a year by a board certified dermatologist   Use your birthday as an annual reminder to have your "Birthday Suit" (I e , your skin) examined; it is a nice birthday gift to yourself to know that your skin is healthy appearing! Additionally, at-home self examinations may be helpful for detecting a possible melanoma  Use the ABCDEs we discussed and check your moles once a month at home  XEROSIS ("DRY SKIN")    Physical Exam:   Anatomic Location Affected:  Extremities    Morphological Description:  Mild xerosis   Pertinent Positives:   Pertinent Negatives:    Assessment and Plan:  Based on a thorough discussion of this condition and the management approach to it (including a comprehensive discussion of the known risks, side effects and potential benefits of treatment), the patient (family) agrees to implement the following specific plan:  Use moisturizer like Eucerin,Cerave, Vanicream or Aveeno Cream 3 times a day for the dry skin             Dry skin refers to skin that feels dry to touch  Dry skin has a dull surface with a rough, scaly quality  The skin is less pliable and cracked   When dryness is severe, the skin may become inflamed and fissured  Although any body site can be dry, dry skin tends to affect the shins more than any other site  Dry skin is lacking moisture in the outer horny cell layer (stratum corneum) and this results in cracks in the skin surface  Dry skin is also called xerosis, xeroderma or asteatosis (lack of fat)  It can affect males and females of all ages  There is some racial variability in water and lipid content of the skin   Dry skin that starts in early childhood may be one of about 20 types of ichthyosis (fish-scale skin)  There is often a family history of dry skin   Dry skin is commonly seen in people with atopic dermatitis   Nearly everyone > 60 years has dry skin  Dry skin that begins later may be seen in people with certain diseases and conditions   Postmenopausal women   Hypothyroidism   Chronic renal disease    Malnutrition and weight loss    Subclinical dermatitis    Treatment with certain drugs such as oral retinoids, diuretics and epidermal growth factor receptor inhibitors    People exposed to a dry environment may experience dry skin   Low humidity: in desert climates or cool, windy conditions    Excessive air conditioning    Direct heat from a fire or fan heater    Excessive bathing    Contact with soap, detergents and solvents    Inappropriate topical agents such as alcohol    Frictional irritation from rough clothing or abrasives    Dry skin is due to abnormalities in the integrity of the barrier function of the stratum corneum, which is made up of corneocytes   There is an overall reduction in the lipids in the stratum corneum   Ratio of ceramides, cholesterol and free fatty acids may be normal or altered   There may be a reduction in the proliferation of keratinocytes      Keratinocyte subtypes change in dry skin with a decrease in keratins K1, K10 and increase in K5, K14     Involucrin (a protein) may be expressed early, increasing cell stiffness   The result is retention of corneocytes and reduced water-holding capacity  The inherited forms of ichthyosis are due to loss of function mutations in various genes (listed in parentheses below)  The clinical features of ichthyosis depend on the specific type of ichthyosis:   Ichthyosis vulgaris (FLG)   Recessive X-linked ichthyosis (STS)    Autosomal recessive congenital ichthyosis (ABCA12, TGM1, ALOXE3)    Keratinopathic ichthyoses (KRT1, KRT10, KRT2)    Acquired ichthyosis may be due to:   Metabolic factors: thyroid deficiency    Illness: lymphoma, internal malignancy, sarcoidosis, HIV infection    Drugs: nicotinic acid, kava, protein kinase inhibitors (eg EGFR inhibitors), hydroxyurea  Complications of dry skin:  Dry areas of skin may become itchy, indicating a form of eczema/dermatitis has developed   Atopic eczema -- especially in people with ichthyosis vulgaris    Eczema craquelé -- especially in elderly people  Also called asteatotic eczema    A dry form of nummular dermatitis/discoid eczema -- especially in people that wash their skin excessively  When the dry skin of an elderly person is itchy without a visible rash, it is sometimes called winter itch, 7th age itch, senile pruritus or chronic pruritus of the elderly  Other complications of dry skin may include:   Skin infection when bacteria or viruses penetrate a break in the skin surface    Overheating, especially in some forms of ichthyosis    Food allergy, eg, to peanuts, has been associated with filaggrin mutations    Contact allergy, eg, to nickel, has also been correlated with barrier function defects  How is the type of dry skin diagnosed? The type of dry skin is diagnosed by careful history and examination      In children:   Family history    Age of onset    Appearance at birth, if known    Distribution of dry skin    Other features, eg eczema, abnormal nails, hair, dentition, sight, hearing  In adults:   Medical history    Medications and topical preparations    Bathing frequency and use of soap    Evaluation of environmental factors that may contribute to dry skin  What is the treatment for dry skin? The mainstay of treatment of dry skin and ichthyosis is moisturisers/emollients  They should be applied liberally and often enough to:   Reduce itch    Improve the barrier function    Prevent entry of irritants, bacteria    Reduce transepidermal water loss  When considering which emollient is most suitable, consider:   Severity of the dryness    Tolerance    Personal preference    Cost and availability  Emollients generally work best if applied to damp skin, if pH is below 7 (acidic), and if containing humectants such as urea or propylene glycol  Additional treatments include:   Topical steroid if itchy or there is dermatitis -- choose an emollient base    Topical calcineurin inhibitors if topical steroids are unsuitable  How can dry skin be prevented? Eliminate aggravating factors:   Reduce the frequency of bathing   A humidifier in winter and air conditioner in summer    Compare having a short shower with a prolonged soak in a bath   Use lukewarm, not hot, water   Replace standard soap with a substitute such as a synthetic detergent cleanser, water-miscible emollient, bath oil, anti-pruritic tar oil, colloidal oatmeal etc     Apply an emollient liberally and often, particularly shortly after bathing, and when itchy  The drier the skin, the thicker this should be, especially on the hands  What is the outlook for dry skin? A tendency to dry skin may persist life-long, or it may improve once contributing factors are controlled      Rogers Pereyra MD  Scribe Attestation    I,:  Hilda Tenorio am acting as a scribe while in the presence of the attending physician :       I,:  Gabe Diez MD personally performed the services described in this documentation    as scribed in my presence :

## 2022-02-22 NOTE — PATIENT INSTRUCTIONS
NEOPLASM OF UNCERTAIN BEHAVIOR OF SKIN        INFORMED CONSENT DISCUSSION AND POST-OPERATIVE INSTRUCTIONS FOR PATIENT    I   RATIONALE FOR PROCEDURE  I understand that a skin biopsy allows the Dermatologist to test a lesion or rash under the microscope to obtain a diagnosis  It usually involves numbing the area with numbing medication and removing a small piece of skin; sometimes the area will be closed with sutures  In this specific procedure, sutures are not usually needed  If any sutures are placed, then they are usually need to be removed in 2 weeks or less  I understand that my Dermatologist recommends that a skin "shave" biopsy be performed today  A local anesthetic, similar to the kind that a dentist uses when filling a cavity, will be injected with a very small needle into the skin area to be sampled  The injected skin and tissue underneath "will go to sleep and become numb so no pain should be felt afterwards  An instrument shaped like a tiny "razor blade" (shave biopsy instrument) will be used to cut a small piece of tissue and skin from the area so that a sample of tissue can be taken and examined more closely under the microscope  A slight amount of bleeding will occur, but it will be stopped with direct pressure and a pressure bandage and any other appropriate methods  I understands that a scar will form where the wound was created  Surgical ointment will be applied to help protect the wound  Sutures are not usually needed      II   RISKS AND POTENTIAL COMPLICATIONS   I understand the risks and potential complications of a skin biopsy include but are not limited to the following:   Bleeding   Infection   Pain   Scar/keloid   Skin discoloration   Incomplete Removal   Recurrence   Nerve Damage/Numbness/Loss of Function   Allergic Reaction to Anesthesia   Biopsies are diagnostic procedures and based on findings additional treatment or evaluation may be required   Loss or destruction of specimen resulting in no additional findings    My Dermatologist has explained to me the nature of the condition, the nature of the procedure, and the benefits to be reasonably expected compared with alternative approaches  My Dermatologist has discussed the likelihood of major risks or complications of this procedure including the specific risks listed above, such as bleeding, infection, and scarring/keloid  I understand that a scar is expected after this procedure  I understand that my physician cannot predict if the scar will form a "keloid," which extends beyond the borders of the wound that is created  A keloid is a thick, painful, and bumpy scar  A keloid can be difficult to treat, as it does not always respond well to therapy, which includes injecting cortisone directly into the keloid every few weeks  While this usually reduces the pain and size of the scar, it does not eliminate it  I understand that photographs may be taken before and after the procedure  These will be maintained as part of the medical providers confidential records and may not be made available to me  I further authorize the medical provider to use the photographs for teaching purposes or to illustrate scientific papers, books, or lectures if in his/her judgment, medical research, education, or science may benefit from its use  I have had an opportunity to fully inquire about the risks and benefits of this procedure and its alternatives  I have been given ample time and opportunity to ask questions and to seek a second opinion if I wished to do so  I acknowledge that there have specifically been no guarantees as to the cosmetic results from the procedure  I am aware that with any procedure there is always the possibility of an unexpected complication  III  POST-PROCEDURAL CARE (WHAT YOU WILL NEED TO DO "AFTER THE BIOPSY" TO OPTIMIZE HEALING)     Keep the area clean and dry    Try NOT to remove the bandage or get it wet for the first 24 hours   Gently clean the area and apply surgical ointment (such as Vaseline petrolatum ointment, which is available "over the counter" and not a prescription) to the biopsy site for up to 2 weeks straight  This acts to protect the wound from the outside world   Sutures are not usually placed in this procedure  If any sutures were placed, return for suture removal as instructed (generally 1 week for the face, 2 weeks for the body)   Take Acetaminophen (Tylenol) for discomfort, if no contraindications  Ibuprofen or aspirin could make bleeding worse   Call our office immediately for signs of infection: fever, chills, increased redness, warmth, tenderness, discomfort/pain, or pus or foul smell coming from the wound  WHAT TO DO IF THERE IS ANY BLEEDING? If a small amount of bleeding is noticed, place a clean cloth over the area and apply firm pressure for ten minutes  Check the wound after 10 minutes of direct pressure  If bleeding persists, try one more time for an additional 10 minutes of direct pressure on the area  If the bleeding becomes heavier or does not stop after the second attempt, or if you have any other questions about this procedure, then please call your [facility] Dermatologist by calling [phone] (SKIN)  I hereby acknowledge that I have reviewed and verified the site with my Dermatologist and have requested and authorized my Dermatologist to proceed with the procedure      CONGENITAL MELANOCYTIC NEVUS    Assessment and Plan:  Based on a thorough discussion of this condition and the management approach to it (including a comprehensive discussion of the known risks, side effects and potential benefits of treatment), the patient (family) agrees to implement the following specific plan:   Reassured benign   Monitor for changes   When outside we recommend using a wide brim hat, sunglasses, long sleeve and pants, sunscreen with SPF 30+ with reapplication every 2 hours, or SPF specific clothing    Recommended annual skin checks      What is a congenital melanocytic nevus? A congenital melanocytic nevus is a proliferation of benign melanocytes that are present at birth or develop shortly after birth  This form of a congenital nevus is also known as a brown birthmark  Similar melanocytic nevi, or moles that were not present at birth, are often called 'congenital melanocytic nevus-like' nevi, 'congenital type' nevi or 'tardive' nevi  2013 Classification of congenital melanocytic nevi  In 2013, a new categorisation of congenital melanocytic nevi using predicted adult size was proposed:   Small (< 1 5 cm)   Medium (M1: 1 5-10 cm, M2: > 10-20 cm)   Large (L1: > 20-30 cm, L2: > 30-40 cm)   Giant (G1: > 40-60 cm, G2: > 60 cm)   Satellite nevi: none, 1-20, > 20-50, and > 50 satellites    Congenital melanocytic nevi should be described according to their body site, colors, surface features and whether or not there is hypertrichosis (hairs)  Progression over time  Congenital melanocytic nevi usually grow proportionally with the child  As a rough guide, the likely adult size of a congenital nevus can be calculated as follows:   Lower limbs: adult size is x 3 3 size at birth   Upper limbs/torso: adult size is x 2 8 size at birth   Head: adult size is x 1 7 size at birth  Descriptive names of some congenital nevi  Some congenital nevi are given specific descriptive names  Some of these are listed here    Speckled lentiginous nevus   Also called nevus spilus   Dark spots on a flat tan background   The number of spots may increase or decrease over time  Satellite lesions   Found on the periphery of central congenital melanocytic nevus or elsewhere on the body   Smaller melanocytic nevi similar in appearance to    Present in > 70% of patients with a large congenital melanocytic nevus  Tardive nevus   Melanocytic nevus that appears after birth   Slower growth and less synthesis of melanin than congenital nevus   Histopathology is similar to true congenital melanocytic nevi  Garment nevus   The name relates to the anatomical location of nevus   Bathing trunk nevus involves central areas usually covered by a bathing costume, for example, buttocks   Coat sleeve nevus involves an entire arm and proximal shoulder  Halo nevus   Affects some congenital and tardive melanocytic nevi   Surrounding skin becomes lighter or white   The central lesion may also become lighter and smaller and may disappear   Due to immune destruction of melanocytes    How common are congenital melanocytic nevi?  Small congenital nevi occur in 1 in 100 births   Medium congenital nevi occur in 1 in 1000 births    Giant congenital melanocytic nevi are much rarer (1 in 20,000 live births)  They occur in all races and ethnic groups, and males and females are at equal risk  What do congenital melanocytic nevi look like? Congenital melanocytic nevi present as single or multi-shaded, round or oval-shaped pigmented patches  They may have increased hair growth (hypertrichosis)  The surface may be slightly rough or bumpy  Congenital nevi usually enlarge as the child grows but they may sometimes become smaller and less obvious with time  Rarely some may even disappear  However, they may also become darker, raised, more bumpy and hairy, particularly around the time of puberty  Do congenital melanocytic nevi cause any symptoms? Congenital melanocytic nevi are usually asymptomatic, however, some may be itchy, particularly larger lesions  It is thought there may be a reduced function of sebaceous (oil) and eccrine (sweat) glands, which may result in skin dryness and a heightened sensation of itch  The overlying skin may become fragile and erode or ulcerate   Deep nests of melanocytes in the dermis may weaken the bonds between the epidermis and the dermis and account for skin fragility  Congenital melanocytic nevi are often unsightly, especially when extensive, ie large or giant congenital melanocytic nevi  They may, therefore, result in anxiety and impaired self-image, especially when the lesions are in visible areas  Giant melanocytic nevi, and to a lesser degree small lesions, are associated with increased risk of developing cutaneous melanoma, neurocutaneous melanoma and rarely other tumours (see below)  What causes a congenital melanocytic nevus? Congenital melanocytic nevi are caused by localised genetic abnormalities resulting in the proliferation of melanocytes; these are cells in the skin responsible for normal skin color  This abnormal proliferation is thought to occur between the 5th and 24th weeks of gestation  If proliferation starts early in development, giant and medium-sized congenital melanocytic nevi are formed  Smaller congenital melanocytic nevi are formed later in development after the melanoblasts (immature melanocytes) have migrated from the neural crest to the skin  In some cases, there is also overgrowth of hair-forming cells and epidermis, forming an organoid nevus  Very early onset of congenital nevus before the separation of the upper and lower eyelids results in kissing nevi, ie one part of the nevus is on the upper lid and the other part is on the lower eyelid  How is the diagnosis of congenital melanocytic nevus made? The diagnosis of a congenital melanocytic nevus is usually based on the clinical appearance  If there is any doubt, examining the lesion with dermoscopy or taking a sample of the lesion for histology (biopsy) may show characteristic microscopic features  Dermoscopy  Evaluation of the congenital melanocytic nevus by dermoscopy will reveal the pattern of pigmentation and its symmetry or lack of symmetry   The most common global pattern of congenital or tardive melanocytic nevus is globular, but reticular, structureless and mixed patterns may occur  The nevus may have differing structures across the lesion, sometimes leading to overall asymmetry of the structure  Pathology  Congenital melanocytic nevi are usually larger than acquired nevi (which are melanocytic nevi that appear after 3years of age), and the nevus cells often extend deeper into the dermis, fat layer, and deeper structures  The nevus cells characteristically cluster around blood vessels, hair follicles, sebaceous and eccrine glands, and other skin structures  Congenital nevus cells tend to involve collagen bundles in the deeper layers of the skin more than is the case in an acquired nevus  Risk of developing melanoma within a congenital melanocytic nevus  The following characteristics of congenital melanocytic nevus are associated with the increased risk of development of melanoma (a skin cancer)   Large or giant size   Axial or paravertebral location (crossing the spine)   Multiple congenital satellite nevi   Neurocutaneous melanosis  The risk of melanoma is mainly related to the size of the congenital melanocytic nevus  Small and medium-sized congenital melanocytic nevi have a very small risk, well under 1%  Melanoma is more likely to develop in giant congenital nevi (lifetime estimates are 5-10%), particularly in lesions that lie across the spine or where there are multiple satellite lesions  Melanoma can start deep inside the nevus or within any neuromelanosis found in the brain and spinal cord  Very rarely, other tissues that contain melanocytes may also be a source of melanoma such as the gastrointestinal tract mucosa  In 24% of cases, the origin of the melanoma cannot be identified  Melanoma associated with a giant congenital melanocytic nevus or neuromelanosis can be very difficult to detect and treat    The risk of development of melanoma is greater in early childhood; 70% of melanomas associated with giant congenital melanocytic nevi are diagnosed by the age of ten years  Rarely, other types of tumour may develop within giant congenital melanocytic nevi including benign tumours (lipomas, schwannomas) and other malignant tumours (including sarcomas)  Melanoma can also develop within a small congenital melanocytic nevus  This is rare and likely to occur on the periphery of the nevus during adult life  Is regular follow-up recommended?  It can be useful to have a close-up photograph of the congenital nevus with a ruler beside it to assess for changes in size   Digital surveillance using dermoscopic images (mole mapping) may also be helpful to detect changes in structure  However, such changes are normal in childhood and should not usually give rise to concern   It is advisable to continue neurodevelopmental observation in those at risk of neurocutaneous melanosis  Prognosis of melanoma associated with congenital melanocytic nevus  Unfortunately, when a rare melanoma arises within a giant congenital melanocytic nevus, the prognosis is unfavourable  This is due to the deeper origin of the tumour rendering it more difficult to detect on clinical examination, resulting in a later stage at presentation  The deeper location also facilitates earlier spread through blood and lymph vessels  In 24% of cases, the melanoma has already spread to other sites (metastases) at the time of the first diagnosis  Treatment of a congenital melanocytic nevus  Management of a congenital melanocytic nevus must take into account the age of the subject, the lesion size, the location and depth, and the risk of developing malignant change within the lesion  Giant congenital melanocytic nevus  The only definite indication for surgery in a giant congenital melanocytic nevus is when melanoma develops within it      Small congenital nevus  If a small congenital nevus is growing at the same rate as the child and is not changing in any other way, the usual practice is not to remove it until the child is old enough to co-operate with a local anaesthetic injection, usually around the age of 8 to 15 years  Even then, removal is not essential   Reasons to consider surgical removal may include:   Unsightly appearance   Difficulty in observing the mole (eg, scalp, back)   A recent change in the lesion (darkening, lumpiness, increasing size)   Melanoma-like appearance (irregular shape, variegated color)  Prophylactic surgical removal of a nevus  The following factors should be considered prior to prophylactic surgical removal of a nevus   Prophylactic excision of a small lesion may be delayed until an age when the patient is old enough to make an informed choice   Small or medium-sized congenital melanocytic nevi are at low risk for developing malignant change   Irregular, lumpy or thick lesions or lesions that are difficult to clinically assess may have a lower threshold for consideration of surgical excision, so as not to miss a melanoma   50% of melanomas diagnosed in those with giant congenital melanocytic nevi occur at another body site such as within the central nervous system  Therefore surgical excision of the lesion may not eliminate the risk of melanoma   Large or giant melanocytic lesions may be too large to excise completely   Large lesions may require a skin flap or graft to close the surgical defect  Complications of surgery  Complications that may occur after surgery include:   Graft or flap failure   Infection   Wound breakdown   Bleeding or haematoma   Hypertrophic or keloid scar   Irritable or itchy scar  Other treatment options for a congenital melanocytic nevus    Dermabrasion  Dermabrasion can allow partial removal of a large congenital nevus; deeper nevus cells may persist  Dermabrasion may lighten the color of the nevus but may not reduce hair growth within it  It can cause scarring      Tangential (shave) excision  Tangential or shave excision uses a blade to remove the top layers of the skin (epidermis and upper dermis)  This may reduce the pigmentation but the lesion may not be completely removed  Shave excision may result in significant scarring  Chemical peels  Chemical peels using trichloroacetic acid or phenol may lighten the pigmentation of a superficial (surface) congenital nevus that is located in the upper layers of the skin  Laser ablation  Laser treatment is considered if surgical intervention is not possible  They may result in lightening of the lesion  Suitable devices include:   Key Q-switched laser   Carbon dioxide resurfacing laser    HARE ANGIOMAS      Assessment and Plan:  Based on a thorough discussion of this condition and the management approach to it (including a comprehensive discussion of the known risks, side effects and potential benefits of treatment), the patient (family) agrees to implement the following specific plan:   Reassured benign     Assessment and Plan:    Cherry angioma, also known as [employer] spots, are benign vascular skin lesions  A "cherry angioma" is a firm red, blue or purple papule, 0 1-1 cm in diameter  When thrombosed, they can appear black in colour until evaluated with a dermatoscope when the red or purple colour is more easily seen  Cherry angioma may develop on any part of the body but most often appear on the scalp, face, lips and trunk  An angioma is due to proliferating endothelial cells; these are the cells that line the inside of a blood vessel  Angiomas can arise in early life or later in life; the most common type of angioma is a cherry angioma  Cherry angiomas are very common in males and females of any age or race  They are more noticeable in white skin than in skin of colour  They markedly increase in number from about the age of 36  There may be a family history of similar lesions   Eruptive cherry angiomas have been rarely reported to be associated with internal malignancy  The cause of angiomas is unknown  Genetic analysis of cherry angiomas has shown that they frequently carry specific somatic missense mutations in the GNAQ and GNA11 (Q209H) genes, which are involved in other vascular and melanocytic proliferations  Cherry angioma is usually diagnosed clinically and no investigations are necessary for the majority of lesions  It has a characteristic red-clod or lobular pattern on dermatoscopy (called lacunar pattern using conventional pattern analysis)  When there is uncertainty about the diagnosis, a biopsy may be performed  The angioma is composed of venules in a thickened papillary dermis  Collagen bundles may be prominent between the lobules  Cherry angiomas are harmless, so they do not usually have to be treated  Occasionally, they are removed to exclude a malignant skin lesion such as a nodular melanoma or because they are irritated or bleeding (and a subsequent risk for infection)  To decrease friction over the lesions, we recommend Neutrogena Daily Defense SPF 50+ at least 3 times a day  MELANOCYTIC NEVI ("Moles")      Assessment and Plan:  Based on a thorough discussion of this condition and the management approach to it (including a comprehensive discussion of the known risks, side effects and potential benefits of treatment), the patient (family) agrees to implement the following specific plan:   Monitor for changes   When outside we recommend using a wide brim hat, sunglasses, long sleeve and pants, sunscreen with SPF 05+ with reapplication every 2 hours, or SPF specific clothing    Annual skin checks     Melanocytic Nevi  Melanocytic nevi ("moles") are tan or brown, raised or flat areas of the skin which have an increased number of melanocytes  Melanocytes are the cells in our body which make pigment and account for skin color      Some moles are present at birth (I e , "congenital nevi"), while others come up later in life (i e , "acquired nevi")  The sun can stimulate the body to make more moles  Sunburns are not the only thing that triggers more moles  Chronic sun exposure can do it too  Clinically distinguishing a healthy mole from melanoma may be difficult, even for experienced dermatologists  The "ABCDE's" of moles have been suggested as a means of helping to alert a person to a suspicious mole and the possible increased risk of melanoma  The suggestions for raising alert are as follows:    Asymmetry: Healthy moles tend to be symmetric, while melanomas are often asymmetric  Asymmetry means if you draw a line through the mole, the two halves do not match in color, size, shape, or surface texture  Asymmetry can be a result of rapid enlargement of a mole, the development of a raised area on a previously flat lesion, scaling, ulceration, bleeding or scabbing within the mole  Any mole that starts to demonstrate "asymmetry" should be examined promptly by a board certified dermatologist      Border: Healthy moles tend to have discrete, even borders  The border of a melanoma often blends into the normal skin and does not sharply delineate the mole from normal skin  Any mole that starts to demonstrate "uneven borders" should be examined promptly by a board certified dermatologist      Color: Healthy moles tend to be one color throughout  Melanomas tend to be made up of different colors ranging from dark black, blue, white, or red  Any mole that demonstrates a color change should be examined promptly by a board certified dermatologist      Diameter: Healthy moles tend to be smaller than 0 6 cm in size; an exception are "congenital nevi" that can be larger  Melanomas tend to grow and can often be greater than 0 6 cm (1/4 of an inch, or the size of a pencil eraser)  This is only a guideline, and many normal moles may be larger than 0 6 cm without being unhealthy    Any mole that starts to change in size (small to bigger or bigger to smaller) should be examined promptly by a board certified dermatologist      Evolving: Healthy moles tend to "stay the same "  Melanomas may often show signs of change or evolution such as a change in size, shape, color, or elevation  Any mole that starts to itch, bleed, crust, burn, hurt, or ulcerate or demonstrate a change or evolution should be examined promptly by a board certified dermatologist       Dysplastic Nevi  Dysplastic moles are moles that fit the ABCDE rules of melanoma but are not identified as melanomas when examined under the microscope  They may indicate an increased risk of melanoma in that person  If there is a family history of melanoma, most experts agree that the person may be at an increased risk for developing a melanoma  Experts still do not agree on what dysplastic moles mean in patients without a personal or family history of melanoma  Dysplastic moles are usually larger than common moles and have different colors within it with irregular borders  The appearance can be very similar to a melanoma  Biopsies of dysplastic moles may show abnormalities which are different from a regular mole  Melanoma  Malignant melanoma is a type of skin cancer that can be deadly if it spreads throughout the body  The incidence of melanoma in the [place] is growing faster than any other cancer  Melanoma usually grows near the surface of the skin for a period of time, and then begins to grow deeper into the skin  Once it grows deeper into the skin, the risk of spread to other organs greatly increases  Therefore, early detection and removal of a malignant melanoma may result in a better chance at a complete cure; removal after the tumor has spread may not be as effective, leading to worse clinical outcomes such as death  The true rate of nevus transformation into a melanoma is unknown   It has been estimated that the lifetime risk for any acquired melanocytic nevus on any 21year-old individual transforming into melanoma by age [de-identified] is 0 03% (1 in 3,164) for men and 0 009% (1 in 10,800) for women  The appearance of a "new mole" remains one of the most reliable methods for identifying a malignant melanoma  Occasionally, melanomas appear as rapidly growing, blue-black, dome-shaped bumps within a previous mole or previous area of normal skin  Other times, melanomas are suspected when a mole suddenly appears or changes  Itching, burning, or pain in a pigmented lesion should increase suspicion, but most patients with early melanoma have no skin discomfort whatsoever  Melanoma can occur anywhere on the skin, including areas that are difficult for self-examination  Many melanomas are first noticed by other family members  Suspicious-looking moles may be removed for microscopic examination  You may be able to prevent death from melanoma by doing two simple things:    1  Try to avoid unnecessary sun exposure and protect your skin when it is exposed to the sun  People who live near the equator, people who have intermittent exposures to large amounts of sun, and people who have had sunburns in childhood or adolescence have an increased risk for melanoma  Sun sense and vigilant sun protection may be keys to helping to prevent melanoma  We recommend wearing UPF-rated sun protective clothing and sunglasses whenever possible and applying a moisturizer-sunscreen combination product (SPF 50+) such as Neutrogena Daily Defense to sun exposed areas of skin at least three times a day  2  Have your moles regularly examined by a board certified dermatologist AND by yourself or a family member/friend at home  We recommend that you have your moles examined at least once a year by a board certified dermatologist   Use your birthday as an annual reminder to have your "Birthday Suit" (I e , your skin) examined; it is a nice birthday gift to yourself to know that your skin is healthy appearing! Additionally, at-home self examinations may be helpful for detecting a possible melanoma  Use the ABCDEs we discussed and check your moles once a month at home  XEROSIS ("DRY SKIN")    Assessment and Plan:  Based on a thorough discussion of this condition and the management approach to it (including a comprehensive discussion of the known risks, side effects and potential benefits of treatment), the patient (family) agrees to implement the following specific plan:  Use moisturizer like Eucerin,Cerave, Vanicream or Aveeno Cream 3 times a day for the dry skin             Dry skin refers to skin that feels dry to touch  Dry skin has a dull surface with a rough, scaly quality  The skin is less pliable and cracked  When dryness is severe, the skin may become inflamed and fissured  Although any body site can be dry, dry skin tends to affect the shins more than any other site  Dry skin is lacking moisture in the outer horny cell layer (stratum corneum) and this results in cracks in the skin surface  Dry skin is also called xerosis, xeroderma or asteatosis (lack of fat)  It can affect males and females of all ages  There is some racial variability in water and lipid content of the skin   Dry skin that starts in early childhood may be one of about 20 types of ichthyosis (fish-scale skin)  There is often a family history of dry skin   Dry skin is commonly seen in people with atopic dermatitis   Nearly everyone > 60 years has dry skin  Dry skin that begins later may be seen in people with certain diseases and conditions   Postmenopausal women   Hypothyroidism   Chronic renal disease    Malnutrition and weight loss    Subclinical dermatitis    Treatment with certain drugs such as oral retinoids, diuretics and epidermal growth factor receptor inhibitors    People exposed to a dry environment may experience dry skin     Low humidity: in desert climates or cool, windy conditions    Excessive air conditioning    Direct heat from a fire or fan heater    Excessive bathing    Contact with soap, detergents and solvents    Inappropriate topical agents such as alcohol    Frictional irritation from rough clothing or abrasives    Dry skin is due to abnormalities in the integrity of the barrier function of the stratum corneum, which is made up of corneocytes   There is an overall reduction in the lipids in the stratum corneum   Ratio of ceramides, cholesterol and free fatty acids may be normal or altered   There may be a reduction in the proliferation of keratinocytes   Keratinocyte subtypes change in dry skin with a decrease in keratins K1, K10 and increase in K5, K14     Involucrin (a protein) may be expressed early, increasing cell stiffness   The result is retention of corneocytes and reduced water-holding capacity  The inherited forms of ichthyosis are due to loss of function mutations in various genes (listed in parentheses below)  The clinical features of ichthyosis depend on the specific type of ichthyosis:   Ichthyosis vulgaris (FLG)   Recessive X-linked ichthyosis (STS)    Autosomal recessive congenital ichthyosis (ABCA12, TGM1, ALOXE3)    Keratinopathic ichthyoses (KRT1, KRT10, KRT2)    Acquired ichthyosis may be due to:   Metabolic factors: thyroid deficiency    Illness: lymphoma, internal malignancy, sarcoidosis, HIV infection    Drugs: nicotinic acid, kava, protein kinase inhibitors (eg EGFR inhibitors), hydroxyurea  Complications of dry skin:  Dry areas of skin may become itchy, indicating a form of eczema/dermatitis has developed   Atopic eczema -- especially in people with ichthyosis vulgaris    Eczema craquelé -- especially in elderly people  Also called asteatotic eczema    A dry form of nummular dermatitis/discoid eczema -- especially in people that wash their skin excessively    When the dry skin of an elderly person is itchy without a visible rash, it is sometimes called winter itch, 7th age itch, senile pruritus or chronic pruritus of the elderly  Other complications of dry skin may include:   Skin infection when bacteria or viruses penetrate a break in the skin surface    Overheating, especially in some forms of ichthyosis    Food allergy, eg, to peanuts, has been associated with filaggrin mutations    Contact allergy, eg, to nickel, has also been correlated with barrier function defects  How is the type of dry skin diagnosed? The type of dry skin is diagnosed by careful history and examination  In children:   Family history    Age of onset    Appearance at birth, if known    Distribution of dry skin    Other features, eg eczema, abnormal nails, hair, dentition, sight, hearing  In adults:   Medical history    Medications and topical preparations    Bathing frequency and use of soap    Evaluation of environmental factors that may contribute to dry skin  What is the treatment for dry skin? The mainstay of treatment of dry skin and ichthyosis is moisturisers/emollients  They should be applied liberally and often enough to:   Reduce itch    Improve the barrier function    Prevent entry of irritants, bacteria    Reduce transepidermal water loss  When considering which emollient is most suitable, consider:   Severity of the dryness    Tolerance    Personal preference    Cost and availability  Emollients generally work best if applied to damp skin, if pH is below 7 (acidic), and if containing humectants such as urea or propylene glycol  Additional treatments include:   Topical steroid if itchy or there is dermatitis -- choose an emollient base    Topical calcineurin inhibitors if topical steroids are unsuitable  How can dry skin be prevented? Eliminate aggravating factors:   Reduce the frequency of bathing      A humidifier in winter and air conditioner in summer    Compare having a short shower with a prolonged soak in a bath   Use lukewarm, not hot, water   Replace standard soap with a substitute such as a synthetic detergent cleanser, water-miscible emollient, bath oil, anti-pruritic tar oil, colloidal oatmeal etc     Apply an emollient liberally and often, particularly shortly after bathing, and when itchy  The drier the skin, the thicker this should be, especially on the hands  What is the outlook for dry skin? A tendency to dry skin may persist life-long, or it may improve once contributing factors are controlled

## 2022-02-27 NOTE — RESULT ENCOUNTER NOTE
DERMATOPATHOLOGY RESULT NOTE    Results reviewed by ordering physician  Called patient to personally discuss results  Discussed results with patient  Instructions for Clinical Derm Team:   (remember to route Result Note to appropriate staff):    Call patient and schedule for EXCISION into my resident clinic with Dr Maryanne Conde on one of the following Wednesdays at either 1 pm or 1:45 pm on one of the following dates: 3/9, 3/23, 4/6, 4/20, 5/4    Result & Plan by Specimen:    Specimen A: severely dysplastic nevus  Plan: excision    Tissue Exam: [id number]  Order: [id number]  Visible to patient: Yes (seen)    Dx: Neoplasm of uncertain behavior of skin    1 Result Note    Component   Case Report  Surgical Pathology Report                         Case: [id number]                                   Authorizing Provider: Cici Bernard MD            Collected:           02/22/2022 [facility]              Ordering Location:     [facility]      Received:            02/22/2022 [address] [place] [address]                                                                       Pathologist:           Emerald Oleary MD                                                           Specimen:    Skin, Other, Specimen A: Posterior Neck; Shave                                           Final Diagnosis  A   Skin, posterior neck, shave biopsy:     Lentiginous compound dysplastic nevus with severe cytologic atypia; extending to the tissue edges (see note)      Note: SOX10, HMB45, and MART-1 immunostains were performed and reviewed             Electronically signed by Emerald Oleary MD on 2/25/2022 at 10:10 AM  Additional Information   All reported additional testing was performed with appropriately reactive controls   These tests were developed and their performance characteristics determined by [address] [place] [address] or appropriate performing facility, though some tests may be performed on tissues which have not been validated for performance characteristics (such as staining performed on alcohol exposed cell blocks and decalcified tissues)   Results should be interpreted with caution and in the context of the patients' clinical condition  These tests may not be cleared or approved by the [place]  Food and Drug Administration, though the FDA has determined that such clearance or approval is not necessary  These tests are used for clinical purposes and they should not be regarded as investigational or for research  This laboratory has been approved by [place] [address], designated as a high-complexity laboratory and is qualified to perform these tests  Celina Taylor Description     A  The specimen is received in formalin, labeled with the patient's name and hospital number, and is designated "posterior neck shave"  The specimen consists of a 0 6 x 0 6 x 0 1 cm shave biopsy of tan skin  The epithelial surface exhibits a brown pigmented macule measuring 0 3 x 0 3 cm abutting the closest peripheral margin  Also in the specimen container, not included in the previous measurements is a shave biopsy of tan skin measuring 0 8 x 0 2 x 0 1 cm  Both epithelial surfaces are inked red and both margins of resection are inked green  The largest tissue fragment is bisected  The specimen is entirely submitted between sponges, 1 cassette  Note: The estimated total formalin fixation time based upon information provided by the submitting clinician and the standard processing schedule is under 72 hours  Truman Rose       Clinical Information   Specimen A: Posterior neck; Skin; Shave Biopsy; 42year old male with 0 6 cm x dark brown macule with central hyperpigmentation;  Differential Diagnosis: Dysplastic nevus rule out Atypia        Attention: [facility]          Specimen Collected: 02/22/22  3:26 PM Last Resulted: 02/25/22 10:10 AM    Order Details    View Encounter    Lab and Collection Details    Routing    Result History          Scans on Order [id number]    Lab Result Document - Document on 2/25/2022 10:11 AM

## 2022-04-06 ENCOUNTER — PROCEDURE VISIT (OUTPATIENT)
Dept: DERMATOLOGY | Facility: CLINIC | Age: 42
End: 2022-04-06
Payer: COMMERCIAL

## 2022-04-06 DIAGNOSIS — D23.4 DYSPLASTIC NEVUS OF NECK: Primary | ICD-10-CM

## 2022-04-06 PROCEDURE — 88305 TISSUE EXAM BY PATHOLOGIST: CPT | Performed by: PATHOLOGY

## 2022-04-06 PROCEDURE — 11422 EXC H-F-NK-SP B9+MARG 1.1-2: CPT | Performed by: DERMATOLOGY

## 2022-04-06 PROCEDURE — 12042 INTMD RPR N-HF/GENIT2.6-7.5: CPT | Performed by: DERMATOLOGY

## 2022-04-06 NOTE — PROGRESS NOTES
PROCEDURE:  EXCISION WITH INTERMEDIATE LAYERED CLOSURE     Attending: Saida Calderon  Assistant: Avila Quiros    Pre-Op Diagnosis: Dysplastic nevus  Post-Op Diagnosis: Same   Benign versus Malignant Benign      Lesion Anatomic Location: Posterior neck (Previous Accession Number: [id number])  Pre-op size: 0 7 cm x 0 9 cm  Size of defect:  1 7 cm x 1 9 cm (with 0 5 centimeter margins)  Final repaired wound length:  5 5 cm    Written and verbal, witnessed informed consent was obtained  I discussed that excision is a method of removing lesions both benign and malignant lesions  A portion of normal skin is often taken to ensure completeness of removal   I reviewed that procedure will include numbing the area,  cutting around and under defect, undermining tissue, and closing the wound with sutures both inside and out  These sutures are usually removed in 7 to 14 days  Risks (bleeding, pain, infection, scarring, recurrence) and benefits discussed  It was discussed with patient that every effort is made to minimize scar, but scarring is influenced also by extrinsic factor such as location, age and genetics  Time Out: performed:  yes  Correct patient: yes  Correct site per Clinic Report: yes  Correct site per Patient Report: yes    LOCAL ANESTHESIA: 1% xylocaine with epi     DESCRIPTION OF PROCEDURE: The patient was brought back into the procedure room, anesthetized locally, prepped and draped in the usual fashion  Using a #15 blade with a scalpel, the lesion was excised in elliptical fashion  The wound was  undermined in the  fascial plane  Hemostasis was achieved with light electrocoagulation  Purpose of undermining was to decrease wound tension and facilitate closure  The wound was closed with subcutaneous sutures as follows:    Deep suture:3-0 Vicryl      Epidermal edge closure was accomplished with superficial sutures as follows:    Superficial suture: 4-0 Prolene  Superficial suture type:  Interrupted Estimated blood loss less than 3ml  The patient tolerated the procedure well without any complications  The wound was cleaned with sterile saline, dried off, surgical vaseline ointment was applied, and the wound was covered  A pressure dressing was applied for stabilization and light pressure  The patient was given detailed oral and written instructions on postoperative care as detailed in consent  The patient left in good medical condition  POSTOP DISCUSSION DISCUSSION AND INSTRUCTIONS FOR PATIENT      Rationale for Procedure  A skin excision allows the dermatologist to remove a lesion  The procedure involves a local numbing medication and removing the entire lesion  Typically, the lesion is being removed because it is atypical, traumatized, or for significant appearance reasons  The area will be open like a brush burn and allowed to heal    There will be no sutures  Tissue is sent to pathologist who will reconfirm diagnosis and verify completeness of lesion removal     Description of Procedure  We would like to perform a skin excision today  A local anesthetic, similar to the kind that a dentist uses when filling a cavity, will be injected with a very small needle into the skin area to be sampled  The injected skin and tissue underneath should go to sleep and become numbed so that no further pain should be felt  A scalpel will be used to cut around the lesion and tissue will be submitted to pathology for examination  Depending on the diagnosis the lesion will be excised with a certain amount of normal skin to help assure completeness of lesion removal   The physician will discuss in advance the anticipated size and extent of removal    Bleeding will occur, but it will stopped with direct pressure, sutures, and electrocautery  Surgical Vaseline-type ointment will also applied after the procedure to help create a barrier between the wound and the outside world        Risks and Potential Complications  The advantage of a skin excision is that it allows us to remove a problem lesion quickly  Although this usually permits the lesion to heal as soon as possible with the least scarring, there are some risks and potential complications that include but are not limited to the following:  - Some bleeding is normal at time of procedure and some bleeding on gauze is normal  the first few days after surgery  Profuse bleeding and bleeding with swelling and pain should be reported as detailed  below  - Infection is uncommon in skin surgery  Infection should be reported and is indicated by pain, redness, and discharge of purulent material   - Some dull to at time sharp pain could occur initially the day after surgery  Persistent pain or increasing pain days after surgery is not expected and should be reported  - Every effort is made to minimize scar, but location, size, and genetics do play a role in scar appearance  A surgical wound does not achieve its optimal appearance until 6 months  There are several treatments available if scarring would be problematic including scar creams, silicone pad, laser and scar revision   - Skin discoloration can occur especially in people of color  Its important to avoid sun on wound in first 6 months after surgery  Treatment is available if pigment is problematic   - Incomplete removal of the lesion or recurrence of lesion can occur and this would then require further treatment and more surgery   - Nerve Damage/Numbness/Loss of Function is very rare, but is most likely to occur if lesion is large or if it is in a high risk location  - Allergic Reaction to lidocaine is rare  More commonly,  epinephrine is used  with the lidocaine  Occasionally, epinephrine (aka adrenalin) may cause a brief  feeling of anxiety or jitteriness  - The person at the microscope  (pathologist) may provide additional information that was unexpected   This unexpected finding could provoke the need for additional treatment or evaluation  What You Will Need to Do After the Procedure  1  Keep the area clean and dry the first day  Try NOT to remove the bandage for the first day  2  Gently clean the area with soap and water and apply Vaseline ointment (this is over the counter and not a prescription) to the excision  site for up to 2 weeks  3  Apply a clean appropriately sized bandage to area  Gauze and paper tape are recommended for sensitive skin  4  Return for suture removal as instructed (generally 1 week for the face, 2 weeks for the body)  5  Take Acetaminophen (Tylenol) for discomfort, if no contraindications  Do NOT take Ibuprofen or aspirin unless specifically told to do so by your Dermatologist because these medications can make bleeding worse  6  Call our office immediately for signs of infection: fever, chills, increased redness, warmth, tenderness, discomfort/pain, or pus or foul smell coming from the wound  If bleeding is noticed, place a clean cloth over the area and apply firm pressure for thirty minutes  Check the wound ONLY after 30 minutes of direct pressure; do not cheat and sneak a peak, as that does not count  If bleeding persists after 30 minutes of legitimate direct pressure, then try one more round of direct pressure for an additional 10 minutes to the area  Should the bleeding become heavier or not stop after the second attempt, call [facility] directly at [phone] (SKIN) or, if after hours, go to your local Emergency Room/Emergency Department  Severely atypical nevus excised with 0 5cm margins for 1 9cm excision and 5 5cm closure  Well tolerated  S/R in 2 weeks      Scribe Attestation    I,:  Landon Fabry am acting as a scribe while in the presence of the attending physician :       I,:  Bharati Gore MD personally performed the services described in this documentation    as scribed in my presence :

## 2022-04-21 ENCOUNTER — OFFICE VISIT (OUTPATIENT)
Dept: DERMATOLOGY | Facility: CLINIC | Age: 42
End: 2022-04-21

## 2022-04-21 DIAGNOSIS — Z48.02 ENCOUNTER FOR REMOVAL OF SUTURES: Primary | ICD-10-CM

## 2022-04-21 PROCEDURE — RECHECK: Performed by: DERMATOLOGY

## 2022-04-21 NOTE — PROGRESS NOTES
Suture removal    Date/Time: 4/21/2022 2:29 PM  Performed by: Venkata Osman RN  Authorized by: María Jauregui MD   Universal Protocol:  Consent: Verbal consent obtained  Written consent not obtained  Risks and benefits: risks, benefits and alternatives were discussed  Consent given by: patient  Time out: Immediately prior to procedure a "time out" was called to verify the correct patient, procedure, equipment, support staff and site/side marked as required  Timeout called at: 4/21/2022 2:29 PM   Patient understanding: patient states understanding of the procedure being performed  Patient consent: the patient's understanding of the procedure matches consent given  Procedure consent: procedure consent matches procedure scheduled  Relevant documents: relevant documents present and verified  Test results: test results available and properly labeled  Site marked: the operative site was marked  Radiology Images displayed and confirmed  If images not available, report reviewed: imaging studies not available  Patient identity confirmed: verbally with patient        Patient location:  Clinic  Location:     Location:  [address] location:  Neck (posterior )  Procedure details: Tools used:  Suture removal kit    Wound appearance:  No sign(s) of infection, good wound healing, pink and nontender    Number of sutures removed:  9  Post-procedure details:     Post-removal:  Dressing applied (Vaseline applied )    Patient tolerance of procedure: Tolerated well, no immediate complications  Comments:      Pt instructed to follow up for yearly skin exams   Pt placed on recall list

## 2022-09-20 ENCOUNTER — OFFICE VISIT (OUTPATIENT)
Dept: BARIATRICS | Facility: CLINIC | Age: 42
End: 2022-09-20
Payer: COMMERCIAL

## 2022-09-20 VITALS
HEART RATE: 78 BPM | BODY MASS INDEX: 35.49 KG/M2 | DIASTOLIC BLOOD PRESSURE: 82 MMHG | WEIGHT: 262 LBS | HEIGHT: 72 IN | SYSTOLIC BLOOD PRESSURE: 130 MMHG

## 2022-09-20 DIAGNOSIS — E66.9 OBESITY, CLASS II, BMI 35-39.9: Primary | ICD-10-CM

## 2022-09-20 PROCEDURE — 99203 OFFICE O/P NEW LOW 30 MIN: CPT | Performed by: FAMILY MEDICINE

## 2022-09-20 NOTE — PROGRESS NOTES
Assessment/Plan:  Melany Brewster was seen today for consult  Diagnoses and all orders for this visit:    Obesity, Class II, BMI 35-39 9  -     CBC and differential; Future  -     Comprehensive metabolic panel; Future  -     TSH, 3rd generation with Free T4 reflex; Future  -     Magnesium; Future  -     ECG 12 lead; Future    VLCD Review:  Contraindications: no  Labs ordered: yes  EKG ordered: yes  HTN meds addressed: n/a  DM2 meds addressed: n/a  VLCD time restriction based on BMI: no  LMP/OCP: n/a  Discussed with patient the hydration requirements while on VLCD which includes at least 80 oz of water daily  Discussed with patient the need to abstain from alcohol while on VLCD  Discussed with patient the need to abstain from strenuous exercise well on VLCD  Products samples provided  Patient can participate in healthy core if he desires following completion of VLCD  - Discussed options of HealthyCORE-Intensive Lifestyle Intervention Program, Very Low Calorie Diet-VLCD and Conservative Program and the role of weight loss medications  - Explained the importance of making lifestyle changes first before starting any anti-obesity medications  Patient should demonstrate lifestyle changes first before anti-obesity medication can be initiated  - Patient is interested in pursuing Very Low Calorie Diet-VLCD  - Initial weight loss goal of 5-10% weight loss for improved health  - Weight loss can improve patient's co-morbid conditions and/or prevent weight-related complications  Total time spent: 30 min, with >50% face-to-face time spent counseling patient on nonsurgical interventions for the treatment of excess weight  Discussed in detail nonsurgical options including intensive lifestyle intervention program, very low-calorie diet program and conservative program   Discussed the role of weight loss medications  Counseled patient on diet behavior and exercise modification for weight loss        Follow up in approximately 2 weeks with Non-Surgical Dietician  Subjective:   Chief Complaint   Patient presents with    Consult     Pt is here for MWM consult  Patient ID: Gloria Patient  is a 42 y o  male with excess weight/obesity here to pursue weight management  Previous notes and records have been reviewed  Past Medical History:   Diagnosis Date    Hypercholesterolemia      Past Surgical History:   Procedure Laterality Date    ANTERIOR CERVICAL DISCECTOMY W/ FUSION      JOINT REPLACEMENT  2009    Spinal fusion in neck    SKIN CANCER EXCISION  04/06/2022    Posterior neck-Severely atypical       HPI:  Patient presents for medical weight management consultation  He is not interested in surgical weight loss  He did participate in very low-calorie diet a little over three years ago and is interested in participating and very low-calorie diet again  His wife would like him to participate in healthy core      Wt Readings from Last 20 Encounters:   09/20/22 119 kg (262 lb)   02/22/22 126 kg (278 lb)   08/27/21 120 kg (265 lb)   08/16/19 97 2 kg (214 lb 3 2 oz)   06/13/19 95 9 kg (211 lb 6 4 oz)   05/31/19 95 5 kg (210 lb 9 6 oz)   05/23/19 96 7 kg (213 lb 1 6 oz)   05/09/19 97 6 kg (215 lb 3 2 oz)   04/25/19 102 kg (224 lb)   04/11/19 105 kg (230 lb 9 6 oz)   03/28/19 109 kg (239 lb 6 oz)   03/14/19 111 kg (244 lb 6 4 oz)   02/28/19 115 kg (253 lb 4 8 oz)   02/14/19 122 kg (269 lb 12 8 oz)   02/06/19 121 kg (266 lb 2 oz)   06/29/18 123 kg (270 lb 3 2 oz)   10/23/10 111 kg (244 lb)   06/21/10 108 kg (237 lb)     Obesity/Excess Weight:  Severity: Moderate  Onset:  Since HS    Modifiers: Diet and Exercise and Physician Supervised Weight Loss Program  Contributing factors: Poor Food Choices and Insufficient Physical Activity  Associated symptoms: comorbid conditions      The following portions of the patient's history were reviewed and updated as appropriate: allergies, current medications, past family history, past medical history, past social history, past surgical history, and problem list     Review of Systems   Constitutional: Negative for fever  Respiratory: Negative for shortness of breath  Cardiovascular: Negative for chest pain  Gastrointestinal: Negative for abdominal pain and blood in stool  Genitourinary: Negative for dysuria and hematuria  Musculoskeletal: Positive for arthralgias and myalgias  Skin: Negative for rash  Neurological: Negative for headaches  Psychiatric/Behavioral: Negative for dysphoric mood and suicidal ideas  The patient is not nervous/anxious  Objective:  /82 (BP Location: Left arm, Patient Position: Sitting, Cuff Size: Large)   Pulse 78   Ht 6' (1 829 m)   Wt 119 kg (262 lb)   BMI 35 53 kg/m²   Constitutional: Well-developed, well-nourished and Obese Body mass index is 35 53 kg/m²  [facility] HEENT: No conjunctival injection  Pulmonary: No increased work of breathing or signs of respiratory distress  CV: Well-perfused  GI: Obese  Non-distended  MSK: No edema   Neuro: Oriented to person, place and time  Normal Speech  Normal gait  Psych: Normal affect and mood  Labs and Imaging  Recent labs and imaging have been personally reviewed    No results found for: WBC, HGB, HCT, MCV, PLT  Lab Results   Component Value Date    SODIUM 143 05/06/2019    K 3 9 05/06/2019     05/06/2019    CO2 28 05/06/2019    AGAP 9 05/06/2019    BUN 15 05/06/2019    CREATININE 1 05 05/06/2019    GLUF 92 05/06/2019    CALCIUM 9 1 05/06/2019    AST 19 02/07/2019    ALT 40 02/07/2019    ALKPHOS 48 02/07/2019    TP 7 3 02/07/2019    TBILI 0 30 02/07/2019    EGFR 89 05/06/2019     Lab Results   Component Value Date    HGBA1C 5 3 08/26/2021     Lab Results   Component Value Date    OOY5UWHHEILM 2 225 02/07/2019     Lab Results   Component Value Date    CHOLESTEROL 250 (H) 08/26/2021     Lab Results   Component Value Date    HDL 48 08/26/2021     Lab Results   Component Value Date TRIG 134 08/26/2021     Lab Results   Component Value Date    LDLCALC 175 (H) 08/26/2021

## 2025-01-10 ENCOUNTER — OFFICE VISIT (OUTPATIENT)
Dept: FAMILY MEDICINE CLINIC | Facility: CLINIC | Age: 45
End: 2025-01-10
Payer: COMMERCIAL

## 2025-01-10 ENCOUNTER — APPOINTMENT (OUTPATIENT)
Dept: LAB | Facility: HOSPITAL | Age: 45
End: 2025-01-10
Attending: FAMILY MEDICINE
Payer: COMMERCIAL

## 2025-01-10 VITALS
WEIGHT: 276 LBS | RESPIRATION RATE: 16 BRPM | DIASTOLIC BLOOD PRESSURE: 80 MMHG | HEIGHT: 72 IN | SYSTOLIC BLOOD PRESSURE: 132 MMHG | BODY MASS INDEX: 37.38 KG/M2 | HEART RATE: 64 BPM | TEMPERATURE: 97 F

## 2025-01-10 DIAGNOSIS — Z11.4 SCREENING FOR HIV (HUMAN IMMUNODEFICIENCY VIRUS): ICD-10-CM

## 2025-01-10 DIAGNOSIS — E78.2 MIXED HYPERLIPIDEMIA: ICD-10-CM

## 2025-01-10 DIAGNOSIS — Z13.0 SCREENING FOR DEFICIENCY ANEMIA: ICD-10-CM

## 2025-01-10 DIAGNOSIS — Z11.59 NEED FOR HEPATITIS C SCREENING TEST: ICD-10-CM

## 2025-01-10 DIAGNOSIS — Z23 ENCOUNTER FOR IMMUNIZATION: ICD-10-CM

## 2025-01-10 DIAGNOSIS — Z12.11 SCREENING FOR COLON CANCER: ICD-10-CM

## 2025-01-10 DIAGNOSIS — Z13.29 SCREENING FOR THYROID DISORDER: ICD-10-CM

## 2025-01-10 DIAGNOSIS — Z00.00 WELL ADULT EXAM: Primary | ICD-10-CM

## 2025-01-10 DIAGNOSIS — E66.812 CLASS 2 SEVERE OBESITY WITH SERIOUS COMORBIDITY AND BODY MASS INDEX (BMI) OF 37.0 TO 37.9 IN ADULT, UNSPECIFIED OBESITY TYPE (HCC): ICD-10-CM

## 2025-01-10 DIAGNOSIS — E66.01 CLASS 2 SEVERE OBESITY WITH SERIOUS COMORBIDITY AND BODY MASS INDEX (BMI) OF 37.0 TO 37.9 IN ADULT, UNSPECIFIED OBESITY TYPE (HCC): ICD-10-CM

## 2025-01-10 LAB
ALBUMIN SERPL BCG-MCNC: 4.7 G/DL (ref 3.5–5)
ALP SERPL-CCNC: 41 U/L (ref 34–104)
ALT SERPL W P-5'-P-CCNC: 37 U/L (ref 7–52)
ANION GAP SERPL CALCULATED.3IONS-SCNC: 3 MMOL/L (ref 4–13)
AST SERPL W P-5'-P-CCNC: 24 U/L (ref 13–39)
BASOPHILS # BLD AUTO: 0.03 THOUSANDS/ΜL (ref 0–0.1)
BASOPHILS NFR BLD AUTO: 0 % (ref 0–1)
BILIRUB SERPL-MCNC: 0.59 MG/DL (ref 0.2–1)
BUN SERPL-MCNC: 14 MG/DL (ref 5–25)
CALCIUM SERPL-MCNC: 9.7 MG/DL (ref 8.4–10.2)
CHLORIDE SERPL-SCNC: 105 MMOL/L (ref 96–108)
CHOLEST SERPL-MCNC: 239 MG/DL (ref ?–200)
CO2 SERPL-SCNC: 31 MMOL/L (ref 21–32)
CREAT SERPL-MCNC: 1.11 MG/DL (ref 0.6–1.3)
EOSINOPHIL # BLD AUTO: 0.09 THOUSAND/ΜL (ref 0–0.61)
EOSINOPHIL NFR BLD AUTO: 1 % (ref 0–6)
ERYTHROCYTE [DISTWIDTH] IN BLOOD BY AUTOMATED COUNT: 12.8 % (ref 11.6–15.1)
GFR SERPL CREATININE-BSD FRML MDRD: 79 ML/MIN/1.73SQ M
GLUCOSE P FAST SERPL-MCNC: 94 MG/DL (ref 65–99)
HCT VFR BLD AUTO: 46.9 % (ref 36.5–49.3)
HCV AB SER QL: NORMAL
HDLC SERPL-MCNC: 50 MG/DL
HGB BLD-MCNC: 15.3 G/DL (ref 12–17)
HIV 1+2 AB+HIV1 P24 AG SERPL QL IA: NORMAL
HIV 2 AB SERPL QL IA: NORMAL
HIV1 AB SERPL QL IA: NORMAL
HIV1 P24 AG SERPL QL IA: NORMAL
IMM GRANULOCYTES # BLD AUTO: 0.03 THOUSAND/UL (ref 0–0.2)
IMM GRANULOCYTES NFR BLD AUTO: 0 % (ref 0–2)
LDLC SERPL CALC-MCNC: 159 MG/DL (ref 0–100)
LYMPHOCYTES # BLD AUTO: 1.79 THOUSANDS/ΜL (ref 0.6–4.47)
LYMPHOCYTES NFR BLD AUTO: 26 % (ref 14–44)
MCH RBC QN AUTO: 29.2 PG (ref 26.8–34.3)
MCHC RBC AUTO-ENTMCNC: 32.6 G/DL (ref 31.4–37.4)
MCV RBC AUTO: 90 FL (ref 82–98)
MONOCYTES # BLD AUTO: 0.46 THOUSAND/ΜL (ref 0.17–1.22)
MONOCYTES NFR BLD AUTO: 7 % (ref 4–12)
NEUTROPHILS # BLD AUTO: 4.37 THOUSANDS/ΜL (ref 1.85–7.62)
NEUTS SEG NFR BLD AUTO: 66 % (ref 43–75)
NRBC BLD AUTO-RTO: 0 /100 WBCS
PLATELET # BLD AUTO: 249 THOUSANDS/UL (ref 149–390)
PMV BLD AUTO: 10.1 FL (ref 8.9–12.7)
POTASSIUM SERPL-SCNC: 4.3 MMOL/L (ref 3.5–5.3)
PROT SERPL-MCNC: 7.5 G/DL (ref 6.4–8.4)
RBC # BLD AUTO: 5.24 MILLION/UL (ref 3.88–5.62)
SODIUM SERPL-SCNC: 139 MMOL/L (ref 135–147)
TRIGL SERPL-MCNC: 150 MG/DL (ref ?–150)
TSH SERPL DL<=0.05 MIU/L-ACNC: 1.43 UIU/ML (ref 0.45–4.5)
WBC # BLD AUTO: 6.77 THOUSAND/UL (ref 4.31–10.16)

## 2025-01-10 PROCEDURE — 99396 PREV VISIT EST AGE 40-64: CPT | Performed by: FAMILY MEDICINE

## 2025-01-10 PROCEDURE — 90471 IMMUNIZATION ADMIN: CPT

## 2025-01-10 PROCEDURE — 87389 HIV-1 AG W/HIV-1&-2 AB AG IA: CPT

## 2025-01-10 PROCEDURE — 90656 IIV3 VACC NO PRSV 0.5 ML IM: CPT

## 2025-01-10 PROCEDURE — 80061 LIPID PANEL: CPT

## 2025-01-10 PROCEDURE — 36415 COLL VENOUS BLD VENIPUNCTURE: CPT

## 2025-01-10 PROCEDURE — 80053 COMPREHEN METABOLIC PANEL: CPT

## 2025-01-10 PROCEDURE — 85025 COMPLETE CBC W/AUTO DIFF WBC: CPT

## 2025-01-10 PROCEDURE — 86803 HEPATITIS C AB TEST: CPT

## 2025-01-10 PROCEDURE — 84443 ASSAY THYROID STIM HORMONE: CPT

## 2025-01-10 NOTE — PROGRESS NOTES
Adult Annual Physical  Name: Lebron Lake      : 1980      MRN: [id number]  Encounter Provider: Iesha Guillen MD  Encounter Date: 1/10/2025   Encounter department: [facility]    Assessment & Plan  Well adult exam         Screening for deficiency anemia    Orders:    CBC and differential; Future    Screening for thyroid disorder    Orders:    TSH, 3rd generation with Free T4 reflex; Future    Need for hepatitis C screening test    Orders:    Hepatitis C Antibody; Future    Screening for HIV (human immunodeficiency virus)    Orders:    HIV 1/2 AG/AB w Reflex [facility] for 2 yr old and above; Future    Mixed hyperlipidemia    Orders:    Comprehensive metabolic panel; Future    Lipid Panel with Direct LDL reflex; Future    Screening for colon cancer    Orders:    Cologuard    Encounter for immunization    Orders:    influenza vaccine preservative-free 0.5 mL IM (Fluzone, Afluria, Fluarix, Flulaval)    Immunizations and preventive care screenings were discussed with patient today. Appropriate education was printed on patient's after visit summary.         History of Present Illness     Adult Annual Physical:  Patient presents for annual physical.     Diet and Physical Activity:  - Diet/Nutrition: poor diet.  - Exercise: 3-4 times a week on average.    Depression Screening:  - PHQ-2 Score: 0    General Health:  - Sleep: sleeps poorly.  - Hearing: normal hearing bilateral ears.  - Vision: wears glasses and most recent eye exam > 1 year ago.  - Dental: regular dental visits.    /GYN Health:    - History of STDs: no     Health:  - History of STDs: no.     Review of Systems   Constitutional: Negative.    HENT: Negative.     Eyes: Negative.    Respiratory: Negative.     Cardiovascular: Negative.    Gastrointestinal: Negative.    Endocrine: Negative.    Genitourinary: Negative.    Musculoskeletal: Negative.    Neurological: Negative.    Hematological: Negative.    Psychiatric/Behavioral: Negative.            Objective   /80   Pulse 64   Temp (!) 97 °F (36.1 °C)   Resp 16   Ht 6' (1.829 m)   Wt 125 kg (276 lb)   BMI 37.43 kg/m²     Physical Exam  Constitutional:       General: He is not in acute distress.     Appearance: He is well-developed. He is not diaphoretic.   HENT:      Head: Normocephalic and atraumatic.      Right Ear: Hearing, tympanic membrane, ear canal and external ear normal.      Left Ear: Hearing, tympanic membrane, ear canal and external ear normal.      Nose: Nose normal.      Mouth/Throat:      Pharynx: No oropharyngeal exudate.   Eyes:      General: No scleral icterus.        Right eye: No discharge.         Left eye: No discharge.      Conjunctiva/sclera: Conjunctivae normal.      Pupils: Pupils are equal, round, and reactive to light.   Neck:      Thyroid: No thyromegaly.      Trachea: No tracheal deviation.   Cardiovascular:      Rate and Rhythm: Normal rate and regular rhythm.      Heart sounds: Normal heart sounds. No murmur heard.     No friction rub. No gallop.   Pulmonary:      Effort: Pulmonary effort is normal. No respiratory distress.      Breath sounds: Normal breath sounds. No wheezing or rales.   Chest:      Chest wall: No tenderness.   Abdominal:      General: Bowel sounds are normal. There is no distension.      Palpations: Abdomen is soft. There is no mass.      Tenderness: There is no abdominal tenderness. There is no guarding or rebound.   Musculoskeletal:         General: No tenderness or deformity. Normal range of motion.      Cervical back: Normal range of motion and neck supple.   Skin:     General: Skin is warm and dry.      Coloration: Skin is not pale.      Findings: No erythema or rash.   Neurological:      Mental Status: He is alert and oriented to person, place, and time.      Motor: No abnormal muscle tone.      Coordination: Coordination normal.      Deep Tendon Reflexes: Reflexes normal.   Psychiatric:         Behavior: Behavior normal.          Thought Content: Thought content normal.         Judgment: Judgment normal.

## 2025-01-13 ENCOUNTER — RESULTS FOLLOW-UP (OUTPATIENT)
Dept: FAMILY MEDICINE CLINIC | Facility: CLINIC | Age: 45
End: 2025-01-13

## 2025-01-20 ENCOUNTER — OFFICE VISIT (OUTPATIENT)
Age: 45
End: 2025-01-20
Payer: COMMERCIAL

## 2025-01-20 VITALS
BODY MASS INDEX: 37.6 KG/M2 | RESPIRATION RATE: 16 BRPM | DIASTOLIC BLOOD PRESSURE: 72 MMHG | HEIGHT: 72 IN | TEMPERATURE: 98.2 F | SYSTOLIC BLOOD PRESSURE: 120 MMHG | WEIGHT: 277.6 LBS | HEART RATE: 82 BPM

## 2025-01-20 DIAGNOSIS — E66.01 CLASS 2 SEVERE OBESITY WITH SERIOUS COMORBIDITY AND BODY MASS INDEX (BMI) OF 37.0 TO 37.9 IN ADULT, UNSPECIFIED OBESITY TYPE (HCC): ICD-10-CM

## 2025-01-20 DIAGNOSIS — E66.812 CLASS 2 SEVERE OBESITY WITH SERIOUS COMORBIDITY AND BODY MASS INDEX (BMI) OF 37.0 TO 37.9 IN ADULT, UNSPECIFIED OBESITY TYPE (HCC): ICD-10-CM

## 2025-01-20 DIAGNOSIS — E78.5 DYSLIPIDEMIA: Primary | ICD-10-CM

## 2025-01-20 PROCEDURE — 99214 OFFICE O/P EST MOD 30 MIN: CPT | Performed by: PHYSICIAN ASSISTANT

## 2025-01-20 RX ORDER — TIRZEPATIDE 2.5 MG/.5ML
2.5 INJECTION, SOLUTION SUBCUTANEOUS WEEKLY
Qty: 2 ML | Refills: 0 | Status: SHIPPED | OUTPATIENT
Start: 2025-01-20

## 2025-01-20 NOTE — PROGRESS NOTES
Assessment/Plan:  Lebron was seen today for follow-up.    Diagnoses and all orders for this visit:    Dyslipidemia    Class 2 severe obesity with serious comorbidity and body mass index (BMI) of 37.0 to 37.9 in adult, unspecified obesity type (HCC)  -     Ambulatory Referral to Weight Management       No problem-specific Assessment & Plan notes found for this encounter.     - Discussed options of HealthyCORE-Intensive Lifestyle Intervention Program, Very Low Calorie Diet-VLCD, and Conservative Program and the role of weight loss medications.  - Explained the importance of making lifestyle changes first before starting anti-obesity medications.  - Patient should demonstrate lifestyle changes first before anti-obesity medication initiated.   - Patient is interested in pursuing Conservative Program  - Initial weight loss goal of 5-10% weight loss for improved health as studies have shown this is where we see the greatest impact on improving health and decreasing risk of obesity related conditions.  - Weight loss can improve patient's co-morbid conditions and/or prevent weight-related complications.  - Labs reviewed: As below.      General Recommendations:  Nutrition:  Eat breakfast daily.  Do not skip meals.     Food log (ie.) www.[employer].com, sparkpeople.com, loseit.com, calorieking.com, etc.    Practice mindful eating.  Be sure to set aside time to eat, eat slowly, and savor your food.    Hydration:    At least 64oz of water daily.  No sugar sweetened beverages.  No juice (eat the fruit instead).    Exercise:  Studies have shown that the ideal exercise goal is somewhere between 150 to 300 minutes of moderate intensity exercise a week.  Start with exercising 10 minutes every other day and gradually increase physical activity with a goal of at least 150 minutes of moderate intensity exercise a week, divided over at least 3 days a week.  An example of this would be exercising 30 minutes a day, 5 days a  week.  Resistance training can increase muscle mass and increase our resting metabolic rate.   FULL BODY resistance training is recommended 2-3 times a week.  Do not do this on consecutive days to allow for muscle recovery.    Aim for a bare minimum 5000 steps, even on days you do not exercise.    Monitoring:   Weigh yourself daily.  If this causes undue stress, then just weigh yourself once a week.  Weigh yourself the same time of the day with the same amount of clothing on.  Preferably this should be done after waking up, before you eat, and with no clothing or minimal clothing on.    Specific Goals:  Food log (ie.) www.[employer].com,sparkpeople.com,tibdit.com,Fifty100.com,etc.   Gradually increase physical activity to a goal of 5 days per week for 30 minutes of MODERATE intensity PLUS 2 days per week of FULL BODY resistance training  Goal protein intake of 60-80 grams per day        Return visit:  2 months   1)  [facility] Zepbound  RX Zepbound 2.5mg x 4 weeks and then increase to 5mg once weekly. Emphasized the importance of adherence to the medication schedule and lifestyle modifications, including diet and exercise. Provided patient with written materials on Zepbound usage, dietary plans, and exercise recommendations. Discussed the importance of regular monitoring and follow up to ensure the safety and effectiveness of the treatment.  Education provided on side effects, how to monitor and when to see medical attention.  Patient received training on self-administration of the injection. Goal of treatment is to attain a weight loss of approximately 5-10% TBW within next 3-6 months.  Goal is to achieve weight loss to improve overall health and reduce risks associated with obesity and weight related comorbidities.      2) Nutrition RX:  - start tracking intake  - focus on protein- goal is 30 grams per meal; 90 grams per day  - focus on increasing fiber first by increasing vegetable servings per day  - do  not skip breakfast and goal water intake 64 oz daily    Physical Activity RX:  - Goal is 150-200 mins of activity weekly.  Try to include at least 2 strength training sessions; increasing lean body mass will really help you to lose weight and maintain weight loss.      I have sent Zebound to [facility].   - Start Zepbound 2.5 mg subcutaneously weekly. After you have taken the second pen, please give me an update, as we will likely increase the dose the next month if you are tolerating it well.    - Side effects of Zepbound include nausea, vomiting, diarrhea, or constipation. Keep an eye on your heart rate while on Zepbound. If you resting heart rate is greater than 100 beats per minutes, please notify me. If you develop severe abdominal pain, stop Zepbound and go to the emergency room, as that could be a sign of pancreatitis.     - Please notify me if you have surgery, upper endoscopy, or colonoscopy scheduled, as we typically hold Zepbound for one week prior to the procedure.      AOM:  - patient is a good candidate for GLP-1 RA; no hx of pancreatitis or MEN2B tumor     Denies any hx of glaucoma, seizures, kidney stones, gallstones.  Denies Hx of CAD, PAD, palpitations, arrhythmia.   Denies uncontrolled anxiety or depression, suicidal behavior or thinking , insomnia or sleep disturbance.     Total time spent reviewing chart, interviewing patient, examining patient, discussing plan, answering all questions, and documentin min.       ______________________________________________________________________        Subjective:   Chief Complaint   Patient presents with    Follow-up     MWM OD f/u; Waist-49in       HPI: Lebron Lake  is a 44 y.o. male with history of dyslipidemia and excess weight, here to pursue weight loss management.  Previous notes and records have been reviewed.    Weight History  Patient states he started gaining weight after high school. He started working at the time and was going to  college as well so he was a little less active at the time; no other identifiable triggers.     He worked with weight management in the past and did meal replacement shakes which helped him to lose weight.      HPI  Wt Readings from Last 20 Encounters:   01/20/25 126 kg (277 lb 9.6 oz)   01/10/25 125 kg (276 lb)   09/20/22 119 kg (262 lb)   02/22/22 126 kg (278 lb)   08/27/21 120 kg (265 lb)   08/16/19 97.2 kg (214 lb 3.2 oz)   06/13/19 95.9 kg (211 lb 6.4 oz)   05/31/19 95.5 kg (210 lb 9.6 oz)   05/23/19 96.7 kg (213 lb 1.6 oz)   05/09/19 97.6 kg (215 lb 3.2 oz)   04/25/19 102 kg (224 lb)   04/11/19 105 kg (230 lb 9.6 oz)   03/28/19 109 kg (239 lb 6 oz)   03/14/19 111 kg (244 lb 6.4 oz)   02/28/19 115 kg (253 lb 4.8 oz)   02/14/19 122 kg (269 lb 12.8 oz)   02/06/19 121 kg (266 lb 2 oz)   06/29/18 123 kg (270 lb 3.2 oz)   10/23/10 111 kg (244 lb)   06/21/10 108 kg (237 lb)     Excess Weight:    Onset:  20's   Highest weight: 277 lbs   Current weight: 277 lbs  What has been tried: Diet and Exercise, Physician Supervised Weight Loss Program, and Commercial Weight Loss Programs-ie. Weight Watchers, Clio, Nutrisystem, etc.  Contributing factors: Poor Food Choices and Insufficient Physical Activity  Associated symptoms and effects: comorbid conditions, fatigue, and decreased exercise capacity    Food logging:  B: sometimes will skip; bagel or toast and coffee  S:  L: more consistent, salad or sandwich (typically goes out to lunch when working in the office)  S:  D: fish (3x per week), chicken,steak, vegetable + bread  S: tends to snack late at night; poor sleep     Hunger/Cravings:   Dining out: lunch   Hydration: coffee (60 oz); water 60 oz   Alcohol: occasional beer or wine  Smoking: none  Exercise: runs outside when the weather is warmer (30 miles per week), chris. 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 When at work he is very active       Past Medical History:   Diagnosis Date    Hypercholesterolemia      Patient denies personal and family history of  pancreatitis, thyroid cancer, MEN-2 tumors.  Denies any hx of glaucoma, seizures, kidney stones, gallstones.  Denies Hx of CAD, PAD, palpitations, arrhythmia.   Denies uncontrolled anxiety or depression, suicidal behavior or thinking , insomnia or sleep disturbance.   Past Surgical History:   Procedure Laterality Date    ANTERIOR CERVICAL DISCECTOMY W/ FUSION      JOINT REPLACEMENT  2009    Spinal fusion in neck    SKIN CANCER EXCISION  04/06/2022    Posterior neck-Severely atypical     The following portions of the patient's history were reviewed and updated as appropriate: allergies, current medications, past family history, past medical history, past social history, past surgical history, and problem list.    Review Of Systems:  Review of Systems   Constitutional:  Positive for fatigue.   HENT: Negative.     Eyes: Negative.    Gastrointestinal:  Negative for constipation, diarrhea and nausea.   Genitourinary: Negative.    Musculoskeletal: Negative.    Skin: Negative.    Allergic/Immunologic: Negative.    Neurological: Negative.  Negative for headaches.   Hematological: Negative.    Psychiatric/Behavioral: Negative.         Objective:  /72 (BP Location: Left arm, Patient Position: Sitting)   Pulse 82   Temp 98.2 °F (36.8 °C) (Tympanic)   Resp 16   Ht 6' (1.829 m)   Wt 126 kg (277 lb 9.6 oz)   BMI 37.65 kg/m²   Physical Exam  Vitals reviewed.   Constitutional:       Appearance: He is obese.   HENT:      Head: Normocephalic.      Mouth/Throat:      Mouth: Mucous membranes are moist.   Eyes:      Pupils: Pupils are equal, round, and reactive to light.   Cardiovascular:      Rate and Rhythm: Normal rate and regular rhythm.   Pulmonary:      Effort: Pulmonary effort is normal.       Breath sounds: Normal breath sounds.   Abdominal:      General: Bowel sounds are normal.      Palpations: Abdomen is soft.   Musculoskeletal:         General: Normal range of motion.      Cervical back: Normal range of motion.   Skin:     General: Skin is warm and dry.   Neurological:      General: No focal deficit present.      Mental Status: He is alert.   Psychiatric:         Mood and Affect: Mood normal.         Thought Content: Thought content normal.         Judgment: Judgment normal.         Labs and Imaging  Recent labs and imaging have been personally reviewed.  Lab Results   Component Value Date    WBC 6.77 01/10/2025    HGB 15.3 01/10/2025    HCT 46.9 01/10/2025    MCV 90 01/10/2025     01/10/2025     Lab Results   Component Value Date    SODIUM 139 01/10/2025    K 4.3 01/10/2025     01/10/2025    CO2 31 01/10/2025    AGAP 3 (L) 01/10/2025    BUN 14 01/10/2025    CREATININE 1.11 01/10/2025    GLUF 94 01/10/2025    CALCIUM 9.7 01/10/2025    AST 24 01/10/2025    ALT 37 01/10/2025    ALKPHOS 41 01/10/2025    TP 7.5 01/10/2025    TBILI 0.59 01/10/2025    EGFR 79 01/10/2025     Lab Results   Component Value Date    HGBA1C 5.3 08/26/2021     Lab Results   Component Value Date    AAL3JOPYWFSR 1.425 01/10/2025     Lab Results   Component Value Date    CHOLESTEROL 239 (H) 01/10/2025     Lab Results   Component Value Date    HDL 50 01/10/2025     Lab Results   Component Value Date    TRIG 150 (H) 01/10/2025     Lab Results   Component Value Date    LDLCALC 159 (H) 01/10/2025

## 2025-01-20 NOTE — PATIENT INSTRUCTIONS
I have sent Zebound to your pharmacy. The prior authorization process will been done through our prior authorization team and can take up to 3-4 weeks to process through the insurance.     - Start Zepbound 2.5 mg subcutaneously weekly. After you have taken the second pen, please give me an update, as we will likely increase the dose the next month if you are tolerating it well.    - Side effects of Zepbound include nausea, vomiting, diarrhea, or constipation. Keep an eye on your heart rate while on Zepbound. If you resting heart rate is greater than 100 beats per minutes, please notify me. If you develop severe abdominal pain, stop Zepbound and go to the emergency room, as that could be a sign of pancreatitis.     - Please notify me if you have surgery, upper endoscopy, or colonoscopy scheduled, as we typically hold Zepbound for one week prior to the procedure.     -GLP-1 Managing Side Effects Tips:  - focus on small frequent meals  - moderate sugar and fat intake  - change the injection site (abdomen --> thigh--> back of arm)  - eat protein, crackers, mints and sonia-based drinks  - nighttime injections  - drink plenty of water  - consider fiber supplements like miralax    Tips for Nausea:  - Don't drink and eat simultaneously: separate fluids 30-60 minutes before and after meals when experiencing nausea or if you notice you become full quickly  - Choose mild smelling foods- strong smells can exacerbate nausea  - Sonia and peppermint- consider drinking sonia or peppermint tea, which can help alleviate symptoms  - Eat- don't skip meals, if you have nausea, it is understandable that you may not feel like eating. However, skipping meals is generally not recommended as this can lead to lower blood sugar and fatigue. Furthermore, it is essential to consume adequate protein during weight loss. You can opt for a protein shake, a meal replacement supplement, bone broth or soup.     Tips for Constipation:   - Drink  "plenty of water  - Eat food with a high fiber content: increase your fiber intake by consuming these types of foods:   Eat more whole grain products like barley, oats, farro, brown or wild rice and quinoa.    Look for choices with 100% whole wheat, rye, oats or bran as the first ingredient listed    Check nutrition fact labels and choose products with 4gm of dietary fiber or more per serving   Add more beans to your diet   Eat more fresh fruits and vegetables with skins on them    Exercise- increase physical activity as it helps stimulate gastric mobility, which moves stool through the digestive tract     Setting SMART Goals for Weight Management Success:    Setting SMART goals is an effective way to enhance focus and commitment to weight management. By breaking down your goals into actionable steps, you increase the likelihood of achieving your desired outcomes and improving your overall health. Remember, consistency is key and small changes can lead to significant results over time.    Understanding SMART Goals:  SMART is an acronym that stands for:  -Specific: The goal should be clear and specific to help focus efforts.    - Measurable: Quantifying the goal allows tracking progress and maintaining motivation.     - Achievable: The goal should be realistic and attainable to encourage commitment.     - Relevant: It should align with their overall health and lifestyle objectives.     - Time-bound: Setting a deadline creates urgency and prompts action.     Steps to Set SMART goals for Weight Management:  1) Identify the areas for change:  - Discuss and identify what aspects of nutrition, physical activity or behavior you would like to improve.  2) Write down your goals:  - Use the SMART criteria to formulate each goal: Here are examples:   - Specific: \"I want to lose weight\" becomes \"I want to lose 10 lbs.\"     - Measurable: I will eat healthy becomes \" I will include 5 servings of fruits and vegetables in my diet " "daily\"     - Achievable: \"I will exercise more\" becomes \"I will walk for 30 minutes, 3 days a week\"     - Relevant: Ensure your goal aligns with your overall health objectives, like \"losing weight to improve blood pressure\"     Time-bound:  \"I want to lose weight someday\" becomes \"I will lose 10 lbs in 3 months\"     Tips for Success:  - Start small: begin with one or two goals and expand as you gain more confidence.  - Stay flexible: if you find certain goals are not working, don't hesitate to modify them to be more attainable.   - Track progress: keep a journal or use an ryan to monitor progress. This helps you stay accountable and recognizes your accomplishments.   - Celebrate Milestones: acknowledge your achievements along the way, no matter how small they may seem.     Nutrition RX:  - start tracking intake  - focus on protein- goal is 30 grams per meal; 90 grams per day  - focus on increasing fiber first by increasing vegetable servings per day  - do not skip breakfast and goal water intake 64 oz daily    Physical Activity RX:  - Goal is 150-200 mins of activity weekly.  Try to include at least 2 strength training sessions; increasing lean body mass will really help you to lose weight and maintain weight loss.    "

## 2025-02-03 LAB — COLOGUARD RESULT REPORTABLE: NEGATIVE

## 2025-02-24 DIAGNOSIS — E78.5 DYSLIPIDEMIA: ICD-10-CM

## 2025-02-24 DIAGNOSIS — E66.01 CLASS 2 SEVERE OBESITY WITH SERIOUS COMORBIDITY AND BODY MASS INDEX (BMI) OF 37.0 TO 37.9 IN ADULT, UNSPECIFIED OBESITY TYPE (HCC): Primary | ICD-10-CM

## 2025-02-24 DIAGNOSIS — E66.812 CLASS 2 SEVERE OBESITY WITH SERIOUS COMORBIDITY AND BODY MASS INDEX (BMI) OF 37.0 TO 37.9 IN ADULT, UNSPECIFIED OBESITY TYPE (HCC): Primary | ICD-10-CM

## 2025-02-24 RX ORDER — TIRZEPATIDE 5 MG/.5ML
5 INJECTION, SOLUTION SUBCUTANEOUS WEEKLY
Qty: 2 ML | Refills: 2 | Status: SHIPPED | OUTPATIENT
Start: 2025-02-24 | End: 2025-02-25

## 2025-02-25 DIAGNOSIS — E66.01 CLASS 2 SEVERE OBESITY WITH SERIOUS COMORBIDITY AND BODY MASS INDEX (BMI) OF 37.0 TO 37.9 IN ADULT, UNSPECIFIED OBESITY TYPE (HCC): ICD-10-CM

## 2025-02-25 DIAGNOSIS — E78.5 DYSLIPIDEMIA: ICD-10-CM

## 2025-02-25 DIAGNOSIS — E66.812 CLASS 2 SEVERE OBESITY WITH SERIOUS COMORBIDITY AND BODY MASS INDEX (BMI) OF 37.0 TO 37.9 IN ADULT, UNSPECIFIED OBESITY TYPE (HCC): ICD-10-CM

## 2025-02-25 RX ORDER — TIRZEPATIDE 5 MG/.5ML
5 INJECTION, SOLUTION SUBCUTANEOUS WEEKLY
Qty: 2 ML | Refills: 2 | Status: SHIPPED | OUTPATIENT
Start: 2025-02-25

## 2025-03-21 ENCOUNTER — OFFICE VISIT (OUTPATIENT)
Age: 45
End: 2025-03-21
Payer: COMMERCIAL

## 2025-03-21 VITALS
TEMPERATURE: 96.5 F | WEIGHT: 254.8 LBS | DIASTOLIC BLOOD PRESSURE: 70 MMHG | HEIGHT: 72 IN | RESPIRATION RATE: 16 BRPM | BODY MASS INDEX: 34.51 KG/M2 | SYSTOLIC BLOOD PRESSURE: 122 MMHG | HEART RATE: 68 BPM

## 2025-03-21 DIAGNOSIS — E66.812 CLASS 2 SEVERE OBESITY WITH SERIOUS COMORBIDITY AND BODY MASS INDEX (BMI) OF 37.0 TO 37.9 IN ADULT, UNSPECIFIED OBESITY TYPE (HCC): Primary | ICD-10-CM

## 2025-03-21 DIAGNOSIS — E78.5 DYSLIPIDEMIA: ICD-10-CM

## 2025-03-21 DIAGNOSIS — E66.01 CLASS 2 SEVERE OBESITY WITH SERIOUS COMORBIDITY AND BODY MASS INDEX (BMI) OF 37.0 TO 37.9 IN ADULT, UNSPECIFIED OBESITY TYPE (HCC): Primary | ICD-10-CM

## 2025-03-21 PROCEDURE — 99203 OFFICE O/P NEW LOW 30 MIN: CPT | Performed by: PHYSICIAN ASSISTANT

## 2025-03-21 RX ORDER — DIPHENOXYLATE HYDROCHLORIDE AND ATROPINE SULFATE 2.5; .025 MG/1; MG/1
1 TABLET ORAL DAILY
COMMUNITY

## 2025-03-21 NOTE — PROGRESS NOTES
Assessment/Plan:  Lebron was seen today for follow-up.    Diagnoses and all orders for this visit:    Class 2 severe obesity with serious comorbidity and body mass index (BMI) of 37.0 to 37.9 in adult, unspecified obesity type (HCC)    Dyslipidemia         No problem-specific Assessment & Plan notes found for this encounter.     - Discussed options of HealthyCORE-Intensive Lifestyle Intervention Program, Very Low Calorie Diet-VLCD, and Conservative Program and the role of weight loss medications.  - Explained the importance of making lifestyle changes first before starting anti-obesity medications.  - Patient should demonstrate lifestyle changes first before anti-obesity medication initiated.   - Patient is interested in pursuing Conservative Program  - Initial weight loss goal of 5-10% weight loss for improved health as studies have shown this is where we see the greatest impact on improving health and decreasing risk of obesity related conditions.  - Weight loss can improve patient's co-morbid conditions and/or prevent weight-related complications.  - Labs reviewed: As below.      General Recommendations:  Nutrition:  Eat breakfast daily.  Do not skip meals.     Food log (ie.) www.myfitnesspal.com, sparkpeople.com, loseit.com, calorieking.com, etc.    Practice mindful eating.  Be sure to set aside time to eat, eat slowly, and savor your food.    Hydration:    At least 64oz of water daily.  No sugar sweetened beverages.  No juice (eat the fruit instead).    Exercise:  Studies have shown that the ideal exercise goal is somewhere between 150 to 300 minutes of moderate intensity exercise a week.  Start with exercising 10 minutes every other day and gradually increase physical activity with a goal of at least 150 minutes of moderate intensity exercise a week, divided over at least 3 days a week.  An example of this would be exercising 30 minutes a day, 5 days a week.  Resistance training can increase muscle mass and  increase our resting metabolic rate.   FULL BODY resistance training is recommended 2-3 times a week.  Do not do this on consecutive days to allow for muscle recovery.    Aim for a bare minimum 5000 steps, even on days you do not exercise.    Monitoring:   Weigh yourself daily.  If this causes undue stress, then just weigh yourself once a week.  Weigh yourself the same time of the day with the same amount of clothing on.  Preferably this should be done after waking up, before you eat, and with no clothing or minimal clothing on.    Specific Goals:  Food log (ie.) www.Convercent.com,sparkpeople.com,Vantosit.com,Synchro.com,etc.   Gradually increase physical activity to a goal of 5 days per week for 30 minutes of MODERATE intensity PLUS 2 days per week of FULL BODY resistance training  Goal protein intake of 60-80 grams per day        Return visit:  2 months   1) Continue [employer] 5mg Zepbound weekly.      Emphasized the importance of adherence to the medication schedule and lifestyle modifications, including diet and exercise. Provided patient with written materials on Zepbound usage, dietary plans, and exercise recommendations. Discussed the importance of regular monitoring and follow up to ensure the safety and effectiveness of the treatment.  Education provided on side effects, how to monitor and when to see medical attention.  Patient received training on self-administration of the injection. Goal of treatment is to attain a weight loss of approximately 5-10% TBW within next 3-6 months.  Goal is to achieve weight loss to improve overall health and reduce risks associated with obesity and weight related comorbidities.      2) Nutrition RX:  - start tracking intake  - focus on protein- goal is 30 grams per meal; 90 grams per day  - focus on increasing fiber first by increasing vegetable servings per day  - do not skip breakfast and goal water intake 64 oz daily    Physical Activity RX:  - Goal is 150-200  "mins of activity weekly.  Try to include at least 2 strength training sessions; increasing lean body mass will really help you to lose weight and maintain weight loss.      I have sent Zebound to [facility] pharmacy.     - Side effects of Zepbound include nausea, vomiting, diarrhea, or constipation. Keep an eye on your heart rate while on Zepbound. If you resting heart rate is greater than 100 beats per minutes, please notify me. If you develop severe abdominal pain, stop Zepbound and go to the emergency room, as that could be a sign of pancreatitis.     - Please notify me if you have surgery, upper endoscopy, or colonoscopy scheduled, as we typically hold Zepbound for one week prior to the procedure.      AOM:  - patient is a good candidate for GLP-1 RA; no hx of pancreatitis or MEN2B tumor     Denies any hx of glaucoma, seizures, kidney stones, gallstones.  Denies Hx of CAD, PAD, palpitations, arrhythmia.   Denies uncontrolled anxiety or depression, suicidal behavior or thinking , insomnia or sleep disturbance.     Total time spent reviewing chart, interviewing patient, examining patient, discussing plan, answering all questions, and documentin min.       ______________________________________________________________________        Subjective:   Chief Complaint   Patient presents with    Follow-up     MWM- 2 mo F/u; Waist-46in     Initial Weight: 276 lbs  Current Weight: 254 lbs   Change: -22 lbs  Waist circumference initial: 49\"    HPI: Lebron Lake  is a 44 y.o. male with history of dyslipidemia and excess weight, here to pursue weight loss management.  Previous notes and records have been reviewed.    Patient presents for follow up today, he is doing well on Zepbound 5mg. His appetite is suppressed, full on smaller portions.     [id number] calories/day. 1-2 days after shot eats a little less. Eating protein >100 grams/day.     Physical Activity: playing basketball and softball with his kids  Walking "   Started back with lifting weights- 2x per week     Weight History  Patient states he started gaining weight after high school. He started working at the time and was going to college as well so he was a little less active at the time; no other identifiable triggers.     He worked with weight management in the past and did meal replacement shakes which helped him to lose weight.      HPI  Wt Readings from Last 20 Encounters:   03/21/25 116 kg (254 lb 12.8 oz)   01/20/25 126 kg (277 lb 9.6 oz)   01/10/25 125 kg (276 lb)   09/20/22 119 kg (262 lb)   02/22/22 126 kg (278 lb)   08/27/21 120 kg (265 lb)   08/16/19 97.2 kg (214 lb 3.2 oz)   06/13/19 95.9 kg (211 lb 6.4 oz)   05/31/19 95.5 kg (210 lb 9.6 oz)   05/23/19 96.7 kg (213 lb 1.6 oz)   05/09/19 97.6 kg (215 lb 3.2 oz)   04/25/19 102 kg (224 lb)   04/11/19 105 kg (230 lb 9.6 oz)   03/28/19 109 kg (239 lb 6 oz)   03/14/19 111 kg (244 lb 6.4 oz)   02/28/19 115 kg (253 lb 4.8 oz)   02/14/19 122 kg (269 lb 12.8 oz)   02/06/19 121 kg (266 lb 2 oz)   06/29/18 123 kg (270 lb 3.2 oz)   10/23/10 111 kg (244 lb)     Excess Weight:    Onset:  20's   Highest weight: 277 lbs   Current weight: 277 lbs  What has been tried: Diet and Exercise, Physician Supervised Weight Loss Program, and Commercial Weight Loss Programs-ie. Weight Watchers, Dianxin, Nutrisystem, etc.  Contributing factors: Poor Food Choices and Insufficient Physical Activity  Associated symptoms and effects: comorbid conditions, fatigue, and decreased exercise capacity    Food logging:  B: sometimes will skip; bagel or toast and coffee  S:  L: more consistent, salad or sandwich (typically goes out to lunch when working in the office)  S:  D: fish (3x per week), chicken,steak, vegetable + bread  S: tends to snack late at night; poor sleep     Hunger/Cravings:   Dining out: lunch   Hydration: coffee (60 oz); water 60 oz   Alcohol: occasional beer or wine  Smoking: none  Exercise: runs outside when the weather  is warmer (30 miles per week), peloton. 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 When at work he is very active       Past Medical History:   Diagnosis Date    Hypercholesterolemia      Patient denies personal and family history of  pancreatitis, thyroid cancer, MEN-2 tumors.  Denies any hx of glaucoma, seizures, kidney stones, gallstones.  Denies Hx of CAD, PAD, palpitations, arrhythmia.   Denies uncontrolled anxiety or depression, suicidal behavior or thinking , insomnia or sleep disturbance.   Past Surgical History:   Procedure Laterality Date    ANTERIOR CERVICAL DISCECTOMY W/ FUSION      JOINT REPLACEMENT  2009    Spinal fusion in neck    SKIN CANCER EXCISION  04/06/2022    Posterior neck-Severely atypical     The following portions of the patient's history were reviewed and updated as appropriate: allergies, current medications, past family history, past medical history, past social history, past surgical history, and problem list.    Review Of Systems:  Review of Systems   Constitutional:  Positive for fatigue.   HENT: Negative.     Eyes: Negative.    Gastrointestinal:  Negative for constipation, diarrhea and nausea.   Genitourinary: Negative.    Musculoskeletal: Negative.    Skin: Negative.    Allergic/Immunologic: Negative.    Neurological: Negative.  Negative for headaches.   Hematological: Negative.    Psychiatric/Behavioral: Negative.         Objective:  /70 (BP Location: Left arm, Patient Position: Sitting)   Pulse 68   Temp (!) 96.5 °F (35.8 °C) (Tympanic)   Resp 16   Ht 6' (1.829 m)   Wt 116 kg (254 lb 12.8 oz)   BMI 34.56 kg/m²   Physical Exam  Vitals reviewed.   Constitutional:       Appearance: He is obese.   HENT:      Head: Normocephalic.      Mouth/Throat:      Mouth: Mucous membranes are moist.   Eyes:      Pupils:  Pupils are equal, round, and reactive to light.   Cardiovascular:      Rate and Rhythm: Normal rate and regular rhythm.   Pulmonary:      Effort: Pulmonary effort is normal.      Breath sounds: Normal breath sounds.   Abdominal:      General: Bowel sounds are normal.      Palpations: Abdomen is soft.   Musculoskeletal:         General: Normal range of motion.      Cervical back: Normal range of motion.   Skin:     General: Skin is warm and dry.   Neurological:      General: No focal deficit present.      Mental Status: He is alert.   Psychiatric:         Mood and Affect: Mood normal.         Thought Content: Thought content normal.         Judgment: Judgment normal.         Labs and Imaging  Recent labs and imaging have been personally reviewed.  Lab Results   Component Value Date    WBC 6.77 01/10/2025    HGB 15.3 01/10/2025    HCT 46.9 01/10/2025    MCV 90 01/10/2025     01/10/2025     Lab Results   Component Value Date    SODIUM 139 01/10/2025    K 4.3 01/10/2025     01/10/2025    CO2 31 01/10/2025    AGAP 3 (L) 01/10/2025    BUN 14 01/10/2025    CREATININE 1.11 01/10/2025    GLUF 94 01/10/2025    CALCIUM 9.7 01/10/2025    AST 24 01/10/2025    ALT 37 01/10/2025    ALKPHOS 41 01/10/2025    TP 7.5 01/10/2025    TBILI 0.59 01/10/2025    EGFR 79 01/10/2025     Lab Results   Component Value Date    HGBA1C 5.3 08/26/2021     Lab Results   Component Value Date    HOD7YYUOBBUL 1.425 01/10/2025     Lab Results   Component Value Date    CHOLESTEROL 239 (H) 01/10/2025     Lab Results   Component Value Date    HDL 50 01/10/2025     Lab Results   Component Value Date    TRIG 150 (H) 01/10/2025     Lab Results   Component Value Date    LDLCALC 159 (H) 01/10/2025

## 2025-06-03 DIAGNOSIS — E66.01 CLASS 2 SEVERE OBESITY WITH SERIOUS COMORBIDITY AND BODY MASS INDEX (BMI) OF 37.0 TO 37.9 IN ADULT, UNSPECIFIED OBESITY TYPE (HCC): ICD-10-CM

## 2025-06-03 DIAGNOSIS — E66.812 CLASS 2 SEVERE OBESITY WITH SERIOUS COMORBIDITY AND BODY MASS INDEX (BMI) OF 37.0 TO 37.9 IN ADULT, UNSPECIFIED OBESITY TYPE (HCC): ICD-10-CM

## 2025-06-03 DIAGNOSIS — E78.5 DYSLIPIDEMIA: ICD-10-CM

## 2025-06-03 RX ORDER — TIRZEPATIDE 5 MG/.5ML
5 INJECTION, SOLUTION SUBCUTANEOUS WEEKLY
Qty: 2 ML | Refills: 0 | Status: SHIPPED | OUTPATIENT
Start: 2025-06-03

## 2025-06-23 ENCOUNTER — OFFICE VISIT (OUTPATIENT)
Age: 45
End: 2025-06-23
Payer: COMMERCIAL

## 2025-06-23 VITALS
HEART RATE: 54 BPM | BODY MASS INDEX: 31.26 KG/M2 | RESPIRATION RATE: 16 BRPM | DIASTOLIC BLOOD PRESSURE: 60 MMHG | SYSTOLIC BLOOD PRESSURE: 100 MMHG | WEIGHT: 230.8 LBS | TEMPERATURE: 96 F | HEIGHT: 72 IN

## 2025-06-23 DIAGNOSIS — E66.812 CLASS 2 SEVERE OBESITY WITH SERIOUS COMORBIDITY AND BODY MASS INDEX (BMI) OF 37.0 TO 37.9 IN ADULT, UNSPECIFIED OBESITY TYPE (HCC): ICD-10-CM

## 2025-06-23 DIAGNOSIS — E66.01 CLASS 2 SEVERE OBESITY WITH SERIOUS COMORBIDITY AND BODY MASS INDEX (BMI) OF 37.0 TO 37.9 IN ADULT, UNSPECIFIED OBESITY TYPE (HCC): ICD-10-CM

## 2025-06-23 DIAGNOSIS — E78.5 DYSLIPIDEMIA: ICD-10-CM

## 2025-06-23 PROCEDURE — 99214 OFFICE O/P EST MOD 30 MIN: CPT | Performed by: PHYSICIAN ASSISTANT

## 2025-06-23 RX ORDER — TIRZEPATIDE 5 MG/.5ML
5 INJECTION, SOLUTION SUBCUTANEOUS WEEKLY
Qty: 2 ML | Refills: 3 | Status: SHIPPED | OUTPATIENT
Start: 2025-06-23

## 2025-06-23 NOTE — PROGRESS NOTES
Assessment/Plan:  Lebron was seen today for follow-up.    Diagnoses and all orders for this visit:    Class 2 severe obesity with serious comorbidity and body mass index (BMI) of 37.0 to 37.9 in adult, unspecified obesity type (HCC)  -     Tirzepatide-Weight Management (Zepbound) 5 mg/0.5 mL subcutaneous solution (vial); Inject 0.5 mL (5 mg total) under the skin once a week    Dyslipidemia  -     Tirzepatide-Weight Management (Zepbound) 5 mg/0.5 mL subcutaneous solution (vial); Inject 0.5 mL (5 mg total) under the skin once a week           No problem-specific Assessment & Plan notes found for this encounter.     - Discussed options of HealthyCORE-Intensive Lifestyle Intervention Program, Very Low Calorie Diet-VLCD, and Conservative Program and the role of weight loss medications.  - Explained the importance of making lifestyle changes first before starting anti-obesity medications.  - Patient should demonstrate lifestyle changes first before anti-obesity medication initiated.   - Patient is interested in pursuing Conservative Program  - Initial weight loss goal of 5-10% weight loss for improved health as studies have shown this is where we see the greatest impact on improving health and decreasing risk of obesity related conditions.  - Weight loss can improve patient's co-morbid conditions and/or prevent weight-related complications.  - Labs reviewed: As below.      General Recommendations:  Nutrition:  Eat breakfast daily.  Do not skip meals.     Food log (ie.) www.[employer]App.com, sparkpeople.com, loseit.com, [employer].com, etc.    Practice mindful eating.  Be sure to set aside time to eat, eat slowly, and savor your food.    Hydration:    At least 64oz of water daily.  No sugar sweetened beverages.  No juice (eat the fruit instead).    Exercise:  Studies have shown that the ideal exercise goal is somewhere between 150 to 300 minutes of moderate intensity exercise a week.  Start with exercising 10 minutes every  other day and gradually increase physical activity with a goal of at least 150 minutes of moderate intensity exercise a week, divided over at least 3 days a week.  An example of this would be exercising 30 minutes a day, 5 days a week.  Resistance training can increase muscle mass and increase our resting metabolic rate.   FULL BODY resistance training is recommended 2-3 times a week.  Do not do this on consecutive days to allow for muscle recovery.    Aim for a bare minimum 5000 steps, even on days you do not exercise.    Monitoring:   Weigh yourself daily.  If this causes undue stress, then just weigh yourself once a week.  Weigh yourself the same time of the day with the same amount of clothing on.  Preferably this should be done after waking up, before you eat, and with no clothing or minimal clothing on.    Specific Goals:  Food log (ie.) www.[employer].com,sparkpeople.com,[employer].com,[employer].com,etc.   Gradually increase physical activity to a goal of 5 days per week for 30 minutes of MODERATE intensity PLUS 2 days per week of FULL BODY resistance training  Goal protein intake of 60-80 grams per day        Return visit:  2 months   1) Continue [employer]Direct 5mg Zepbound weekly.      Emphasized the importance of adherence to the medication schedule and lifestyle modifications, including diet and exercise. Provided patient with written materials on Zepbound usage, dietary plans, and exercise recommendations. Discussed the importance of regular monitoring and follow up to ensure the safety and effectiveness of the treatment.  Education provided on side effects, how to monitor and when to see medical attention.  Patient received training on self-administration of the injection. Goal of treatment is to attain a weight loss of approximately 5-10% TBW within next 3-6 months.  Goal is to achieve weight loss to improve overall health and reduce risks associated with obesity and weight related comorbidities.      2)  "Nutrition RX:  - start tracking intake  - focus on protein- goal is 30 grams per meal; 90 grams per day  - focus on increasing fiber first by increasing vegetable servings per day  - do not skip breakfast and goal water intake 64 oz daily    Physical Activity RX:  - Goal is 150-200 mins of activity weekly.  Try to include at least 2 strength training sessions; increasing lean body mass will really help you to lose weight and maintain weight loss.      I have sent Zebound to [facility].     - Side effects of Zepbound include nausea, vomiting, diarrhea, or constipation. Keep an eye on your heart rate while on Zepbound. If you resting heart rate is greater than 100 beats per minutes, please notify me. If you develop severe abdominal pain, stop Zepbound and go to the emergency room, as that could be a sign of pancreatitis.     - Please notify me if you have surgery, upper endoscopy, or colonoscopy scheduled, as we typically hold Zepbound for one week prior to the procedure.      AOM:  - patient is a good candidate for GLP-1 RA; no hx of pancreatitis or MEN2B tumor     Denies any hx of glaucoma, seizures, kidney stones, gallstones.  Denies Hx of CAD, PAD, palpitations, arrhythmia.   Denies uncontrolled anxiety or depression, suicidal behavior or thinking , insomnia or sleep disturbance.     Total time spent reviewing chart, interviewing patient, examining patient, discussing plan, answering all questions, and documentin min.       ______________________________________________________________________        Subjective:   Chief Complaint   Patient presents with    Follow-up     MWM- 3 mo F/u; Waist-41.5in     Initial Weight: 276 lbs  Current Weight: 230 lbs   Change: -46 lbs  Waist circumference initial: 49\"  Current waist circumference 41.5\"     HPI: Lebron Lake  is a 44 y.o. male with history of dyslipidemia and excess weight, here to pursue weight loss management.  Previous notes and records have been " "reviewed.    Patient returns for follow up today, he has been doing well on Zepbound 5mg. No reported side effects.  He has lost 20 lbs since his last visit and almost 4-5\" off the waist circumference.     Diet Recall:  Getting in protein with each meal fish, meat  [place]mo asparagus, broccoli   Chicken and vegetable for lunch         [id number] calories/day. 1-2 days after shot eats a little less. Eating protein >100 grams/day.     Water- 100-120 water especially with running in heat   Physical Activity:   Running 20 miles per week (4 days per week)   playing basketball and softball with his kids    Started back with lifting weights- 2x per week     Weight History  Patient states he started gaining weight after high school. He started working at the time and was going to college as well so he was a little less active at the time; no other identifiable triggers.     He worked with weight management in the past and did meal replacement shakes which helped him to lose weight.      HPI  Wt Readings from Last 20 Encounters:   06/23/25 105 kg (230 lb 12.8 oz)   03/21/25 116 kg (254 lb 12.8 oz)   01/20/25 126 kg (277 lb 9.6 oz)   01/10/25 125 kg (276 lb)   09/20/22 119 kg (262 lb)   02/22/22 126 kg (278 lb)   08/27/21 120 kg (265 lb)   08/16/19 97.2 kg (214 lb 3.2 oz)   06/13/19 95.9 kg (211 lb 6.4 oz)   05/31/19 95.5 kg (210 lb 9.6 oz)   05/23/19 96.7 kg (213 lb 1.6 oz)   05/09/19 97.6 kg (215 lb 3.2 oz)   04/25/19 102 kg (224 lb)   04/11/19 105 kg (230 lb 9.6 oz)   03/28/19 109 kg (239 lb 6 oz)   03/14/19 111 kg (244 lb 6.4 oz)   02/28/19 115 kg (253 lb 4.8 oz)   02/14/19 122 kg (269 lb 12.8 oz)   02/06/19 121 kg (266 lb 2 oz)   06/29/18 123 kg (270 lb 3.2 oz)     Excess Weight:    Onset:  20's   Highest weight: 277 lbs   Current weight: 277 lbs  What has been tried: Diet and Exercise, Physician Supervised Weight Loss Program, and Commercial Weight Loss Programs-ie. Weight Watchers, Emma Velasco, Nutrisystem, " etc.  Contributing factors: Poor Food Choices and Insufficient Physical Activity  Associated symptoms and effects: comorbid conditions, fatigue, and decreased exercise capacity    Food logging:  B: sometimes will skip; bagel or toast and coffee  S:  L: more consistent, salad or sandwich (typically goes out to lunch when working in the office)  S:  D: fish (3x per week), chicken,steak, vegetable + bread  S: tends to snack late at night; poor sleep     Hunger/Cravings:   Dining out: lunch   Hydration: coffee (60 oz); water 60 oz   Alcohol: occasional beer or wine  Smoking: none  Exercise: runs outside when the weather is warmer (30 miles per week), peloton. 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 When at work he is very active       Past Medical History:   Diagnosis Date    Hypercholesterolemia      Patient denies personal and family history of  pancreatitis, thyroid cancer, MEN-2 tumors.  Denies any hx of glaucoma, seizures, kidney stones, gallstones.  Denies Hx of CAD, PAD, palpitations, arrhythmia.   Denies uncontrolled anxiety or depression, suicidal behavior or thinking , insomnia or sleep disturbance.   Past Surgical History:   Procedure Laterality Date    ANTERIOR CERVICAL DISCECTOMY W/ FUSION      JOINT REPLACEMENT  2009    Spinal fusion in neck    SKIN CANCER EXCISION  04/06/2022    Posterior neck-Severely atypical     The following portions of the patient's history were reviewed and updated as appropriate: allergies, current medications, past family history, past medical history, past social history, past surgical history, and problem list.    Review Of Systems:  Review of Systems   Constitutional:  Positive for fatigue.   HENT: Negative.     Eyes: Negative.    Gastrointestinal:  Negative for constipation, diarrhea and nausea.   Genitourinary:  Negative.    Musculoskeletal: Negative.    Skin: Negative.    Allergic/Immunologic: Negative.    Neurological: Negative.  Negative for headaches.   Hematological: Negative.    Psychiatric/Behavioral: Negative.         Objective:  /60 (BP Location: Left arm, Patient Position: Sitting)   Pulse (!) 54   Temp (!) 96 °F (35.6 °C) (Tympanic)   Resp 16   Ht 6' (1.829 m)   Wt 105 kg (230 lb 12.8 oz)   BMI 31.30 kg/m²   Physical Exam  Vitals reviewed.   Constitutional:       Appearance: He is obese.   HENT:      Head: Normocephalic.      Mouth/Throat:      Mouth: Mucous membranes are moist.     Eyes:      Pupils: Pupils are equal, round, and reactive to light.       Cardiovascular:      Rate and Rhythm: Normal rate and regular rhythm.   Pulmonary:      Effort: Pulmonary effort is normal.      Breath sounds: Normal breath sounds.   Abdominal:      General: Bowel sounds are normal.      Palpations: Abdomen is soft.     Musculoskeletal:         General: Normal range of motion.      Cervical back: Normal range of motion.     Skin:     General: Skin is warm and dry.     Neurological:      General: No focal deficit present.      Mental Status: He is alert.     Psychiatric:         Mood and Affect: Mood normal.         Thought Content: Thought content normal.         Judgment: Judgment normal.         Labs and Imaging  Recent labs and imaging have been personally reviewed.  Lab Results   Component Value Date    WBC 6.77 01/10/2025    HGB 15.3 01/10/2025    HCT 46.9 01/10/2025    MCV 90 01/10/2025     01/10/2025     Lab Results   Component Value Date    SODIUM 139 01/10/2025    K 4.3 01/10/2025     01/10/2025    CO2 31 01/10/2025    AGAP 3 (L) 01/10/2025    BUN 14 01/10/2025    CREATININE 1.11 01/10/2025    GLUF 94 01/10/2025    CALCIUM 9.7 01/10/2025    AST 24 01/10/2025    ALT 37 01/10/2025    ALKPHOS 41 01/10/2025    TP 7.5 01/10/2025    TBILI 0.59 01/10/2025    EGFR 79 01/10/2025     Lab Results    Component Value Date    HGBA1C 5.3 08/26/2021     Lab Results   Component Value Date    ACM5WIQTSQFB 1.425 01/10/2025     Lab Results   Component Value Date    CHOLESTEROL 239 (H) 01/10/2025     Lab Results   Component Value Date    HDL 50 01/10/2025     Lab Results   Component Value Date    TRIG 150 (H) 01/10/2025     Lab Results   Component Value Date    LDLCALC 159 (H) 01/10/2025